# Patient Record
Sex: FEMALE | Race: WHITE | Employment: FULL TIME | URBAN - METROPOLITAN AREA
[De-identification: names, ages, dates, MRNs, and addresses within clinical notes are randomized per-mention and may not be internally consistent; named-entity substitution may affect disease eponyms.]

---

## 2023-06-14 ENCOUNTER — APPOINTMENT (OUTPATIENT)
Dept: GENERAL RADIOLOGY | Age: 43
End: 2023-06-14
Payer: COMMERCIAL

## 2023-06-14 ENCOUNTER — HOSPITAL ENCOUNTER (EMERGENCY)
Age: 43
Discharge: HOME | End: 2023-06-14
Payer: COMMERCIAL

## 2023-06-14 VITALS
DIASTOLIC BLOOD PRESSURE: 98 MMHG | OXYGEN SATURATION: 97 % | TEMPERATURE: 98.1 F | SYSTOLIC BLOOD PRESSURE: 168 MMHG | RESPIRATION RATE: 18 BRPM | HEART RATE: 97 BPM

## 2023-06-14 DIAGNOSIS — S83.412A SPRAIN OF MEDIAL COLLATERAL LIGAMENT OF LEFT KNEE, INITIAL ENCOUNTER: Primary | ICD-10-CM

## 2023-06-14 PROCEDURE — 73562 X-RAY EXAM OF KNEE 3: CPT

## 2023-06-14 PROCEDURE — 99283 EMERGENCY DEPT VISIT LOW MDM: CPT

## 2023-06-14 PROCEDURE — 6370000000 HC RX 637 (ALT 250 FOR IP): Performed by: PHYSICIAN ASSISTANT

## 2023-06-14 RX ORDER — OXYCODONE HYDROCHLORIDE AND ACETAMINOPHEN 5; 325 MG/1; MG/1
1 TABLET ORAL ONCE
Status: COMPLETED | OUTPATIENT
Start: 2023-06-14 | End: 2023-06-14

## 2023-06-14 RX ORDER — TRAMADOL HYDROCHLORIDE 50 MG/1
50 TABLET ORAL EVERY 6 HOURS PRN
Qty: 12 TABLET | Refills: 0 | Status: SHIPPED | OUTPATIENT
Start: 2023-06-14 | End: 2023-06-17

## 2023-06-14 RX ADMIN — OXYCODONE HYDROCHLORIDE AND ACETAMINOPHEN 1 TABLET: 5; 325 TABLET ORAL at 14:01

## 2023-06-14 ASSESSMENT — ENCOUNTER SYMPTOMS
CHEST TIGHTNESS: 0
VOMITING: 0
DIARRHEA: 0
NAUSEA: 0
SHORTNESS OF BREATH: 0
ABDOMINAL PAIN: 0

## 2023-06-14 ASSESSMENT — PAIN SCALES - GENERAL: PAINLEVEL_OUTOF10: 9

## 2023-06-14 NOTE — ED PROVIDER NOTES
[address] [facility]        Pt Name: Serafin De La Cruz  MRN: [id number]  [place] 1980  Date of evaluation: 6/14/2023  Provider: Will Orr PA-C  PCP: No primary care provider on file. Note Started: 2:02 PM EDT 6/14/23      AMY. I have evaluated this patient. CHIEF COMPLAINT       Chief Complaint   Patient presents with    Knee Injury     Patient in with complaints of injuring the left knee while roller skating today. States she heard a pop        HISTORY OF PRESENT ILLNESS: 1 or more Elements     History from : Patient    Limitations to history : None    Serafin De La Cruz is a 37 y.o. female who presents to the emergency department today stating she was rollerskating earlier today when she slipped and fell and heard a pop along the medial aspect of her left knee. She states she tried to get up and walk and felt another pop along the medial aspect of her left knee. She states it is painful to weight-bear. She denies significant swelling. She denies redness or warmth of the left knee. Nursing Notes were all reviewed and agreed with or any disagreements were addressed in the HPI. REVIEW OF SYSTEMS :      Review of Systems   Constitutional:  Negative for chills and fever. Respiratory:  Negative for chest tightness and shortness of breath. Cardiovascular:  Negative for chest pain. Gastrointestinal:  Negative for abdominal pain, diarrhea, nausea and vomiting. Genitourinary:  Negative for dysuria. Musculoskeletal:  Positive for arthralgias, gait problem and joint swelling. All other systems reviewed and are negative. Positives and Pertinent negatives as per HPI. SURGICAL HISTORY     Past Surgical History:   Procedure Laterality Date    CHOLECYSTECTOMY         CURRENTMEDICATIONS       Previous Medications    No medications on file       ALLERGIES     Patient has no known allergies.     FAMILYHISTORY

## 2023-06-19 ENCOUNTER — TELEPHONE (OUTPATIENT)
Dept: ORTHOPEDIC SURGERY | Age: 43
End: 2023-06-19

## 2023-06-19 NOTE — TELEPHONE ENCOUNTER
Other PATIENT CALLED. SHE IS SCHEDULED TO HAVE MRI TODAY AT 9AM. SHE SPOKE WITH DR Deep Morris ON Saturday SINCE HE WAS ON CALL. ASKING THAT WE SEND [facility] HER ORDER STAT.  Josh Cargo [phone]

## 2023-06-20 ENCOUNTER — OFFICE VISIT (OUTPATIENT)
Dept: ORTHOPEDIC SURGERY | Age: 43
End: 2023-06-20

## 2023-06-20 VITALS — RESPIRATION RATE: 18 BRPM | HEIGHT: 62 IN

## 2023-06-20 DIAGNOSIS — S83.412A COMPLETE TEAR OF MEDIAL COLLATERAL LIGAMENT OF LEFT KNEE, INITIAL ENCOUNTER: ICD-10-CM

## 2023-06-20 DIAGNOSIS — S83.512A RUPTURE OF ANTERIOR CRUCIATE LIGAMENT OF LEFT KNEE, INITIAL ENCOUNTER: Primary | ICD-10-CM

## 2023-06-20 DIAGNOSIS — M25.562 ACUTE PAIN OF LEFT KNEE: ICD-10-CM

## 2023-06-20 NOTE — PROGRESS NOTES
CHIEF COMPLAINT: Left knee pain. DATE OF INJURY: 6/14/23    History:   Darell Akers is a 37 y.o. female here for left knee follow-up. Initial history: referred by [facility] ED for evaluation and treatment of left knee pain / injury. The patient complains of left knee pain. This is evaluated as a personal injury. The pain began 1 day ago. Rate pain 10/10. There was a history of injury. She was roller skating at [facility] and went over a wet bridge when she slipped and fell. She describes a valgus injury. She did hear a pop. She tried to get up to walk and heard another pop at the medial aspect of the knee and states her knee \"went completely out\". The pain is located medial and deep within the knee. She has significant pain with weight bearing. She complains of instability with weight bearing. The knee has given out or felt unstable. The patient cannot bend and straighten the knee fully. There is swelling in the knee. There was not catching / locking of the knee. The patient has not had PT. The patient has not had an injection. The patient has taken NSAIDs. She is taking tramadol from the ED but it is causing vomiting. The patient has tried ice. 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    Interval History: Her knee still bothers her. She rates pain 8/10. She has been wearing the short knee brace. She is using a walker, because she had difficulty with crutches. Past Medical History:   Diagnosis Date    Headache        Past Surgical History:   Procedure Laterality Date    CHOLECYSTECTOMY         History reviewed. No pertinent family history.     Social History     Socioeconomic History    Marital status:      Spouse name: None    Number of children: None    Years of education: None    Highest education level: None   Occupational History    Occupation: Group Therapist for [employer]   Tobacco Use    Smoking status: Never    Smokeless tobacco: Never   Substance and

## 2023-06-26 ENCOUNTER — HOSPITAL ENCOUNTER (OUTPATIENT)
Dept: PHYSICAL THERAPY | Age: 43
Setting detail: THERAPIES SERIES
Discharge: HOME | End: 2023-06-26
Payer: COMMERCIAL

## 2023-06-26 PROCEDURE — 97016 VASOPNEUMATIC DEVICE THERAPY: CPT

## 2023-06-26 PROCEDURE — 97530 THERAPEUTIC ACTIVITIES: CPT

## 2023-06-26 PROCEDURE — 97110 THERAPEUTIC EXERCISES: CPT

## 2023-06-26 PROCEDURE — 97161 PT EVAL LOW COMPLEX 20 MIN: CPT

## 2023-06-30 ENCOUNTER — HOSPITAL ENCOUNTER (OUTPATIENT)
Dept: PHYSICAL THERAPY | Age: 43
Setting detail: THERAPIES SERIES
Discharge: HOME | End: 2023-06-30
Payer: COMMERCIAL

## 2023-06-30 PROCEDURE — 97140 MANUAL THERAPY 1/> REGIONS: CPT

## 2023-06-30 PROCEDURE — 97112 NEUROMUSCULAR REEDUCATION: CPT

## 2023-06-30 PROCEDURE — 97110 THERAPEUTIC EXERCISES: CPT

## 2023-07-03 ENCOUNTER — APPOINTMENT (OUTPATIENT)
Dept: PHYSICAL THERAPY | Age: 43
End: 2023-07-03
Payer: COMMERCIAL

## 2023-07-03 ENCOUNTER — HOSPITAL ENCOUNTER (OUTPATIENT)
Dept: PHYSICAL THERAPY | Age: 43
Setting detail: THERAPIES SERIES
Discharge: HOME | End: 2023-07-03
Payer: COMMERCIAL

## 2023-07-03 PROCEDURE — 97110 THERAPEUTIC EXERCISES: CPT

## 2023-07-03 PROCEDURE — 97140 MANUAL THERAPY 1/> REGIONS: CPT

## 2023-07-03 PROCEDURE — 97112 NEUROMUSCULAR REEDUCATION: CPT

## 2023-07-03 NOTE — FLOWSHEET NOTE
[facility]  Phone: [phone]   Fax: [phone]    Physical Therapy Daily Treatment Note    Date:  2023     Patient Name:  Tobi Andrews    :  1980  MRN: [id number]  Medical Diagnosis:  Rupture of anterior cruciate ligament of left knee, initial encounter [P04.061P]  Treatment Diagnosis: Decreased L knee ROM, strength, muscle activation, swelling, weight bearing tolerance and control limiting L LE usage with ADL, IADL, Recreational and work activity. Insurance/Certification information:  PT Insurance Information: [facility] (61 PT visits PCY;  policy; $6533 deductible)  Physician Information:  Sanjeev Yee MD    Plan of care signed (Y/N): []  Yes [x]  No     Date of Patient follow up with Physician:      Progress Report: []  Yes  [x]  No     Date Range for reporting period:  Beginnin2023  Ending:     Progress report due (10 Rx/or 30 days whichever is less): visit #10 or  (date)     Recertification due (POC duration/ or 90 days whichever is less): visit #16 or  (date)     Visit # Insurance Allowable Auth required? Date Range   3 60 []  Yes  [x]  No -       Units approved Units used Date Range   - - -     Latex Allergy:  [x]NO      []YES  Preferred Language for Healthcare:   [x]English       []other:    Functional Scale:       Date assessed:  LEFS: raw score = 0; dysfunction = 100%      Pain level:  1/10     SUBJECTIVE:  Patient reports she has trouble getting past 90 degrees of flexion due to feeling of swelling and squeezing \"like a blood pressure cuff around it. \"    OBJECTIVE:   : PROM to 88 degrees sitting, long sitting AROM: 10-79 degrees, mid patellar measurements at LLE 42.5 cm, RLE 40; bandage observed at abrasion site, no redness or warmth      RESTRICTIONS/PRECAUTIONS: Pt is WBAT with brace on at all times in standing; MRI confirm ACL and MCL tear, meniscal tear and posterior lateral

## 2023-07-06 ENCOUNTER — OFFICE VISIT (OUTPATIENT)
Dept: ORTHOPEDIC SURGERY | Age: 43
End: 2023-07-06

## 2023-07-06 ENCOUNTER — HOSPITAL ENCOUNTER (OUTPATIENT)
Dept: PHYSICAL THERAPY | Age: 43
Setting detail: THERAPIES SERIES
Discharge: HOME | End: 2023-07-06
Payer: COMMERCIAL

## 2023-07-06 VITALS — RESPIRATION RATE: 16 BRPM | BODY MASS INDEX: 30.18 KG/M2 | WEIGHT: 164 LBS | HEIGHT: 62 IN

## 2023-07-06 DIAGNOSIS — S83.512A RUPTURE OF ANTERIOR CRUCIATE LIGAMENT OF LEFT KNEE, INITIAL ENCOUNTER: Primary | ICD-10-CM

## 2023-07-06 DIAGNOSIS — S83.412A COMPLETE TEAR OF MEDIAL COLLATERAL LIGAMENT OF LEFT KNEE, INITIAL ENCOUNTER: ICD-10-CM

## 2023-07-06 PROCEDURE — 97110 THERAPEUTIC EXERCISES: CPT

## 2023-07-06 PROCEDURE — 97116 GAIT TRAINING THERAPY: CPT

## 2023-07-06 NOTE — FLOWSHEET NOTE
purpose of improving mobility and quad tone / recruitment which will allow for increased overall function including but not limited to self-care, transfers, ambulation, and ascending / descending stairs. Charges:  Timed Code Treatment Minutes: 45   Total Treatment Minutes: 45     [] EVAL - LOW (17368)   [] EVAL - MOD (36407)  [] EVAL - HIGH (49042)  [] RE-EVAL (12327)  [x] IB(27945) x 2      [] Ionto  [] NMR (57261) x 1    [] Vaso  [] Manual (57107) x      [] Ultrasound  [x] TA x  1      [] Mech Traction (61687)  [] Aquatic Therapy x     [] ES (un) (95116):   [] Home Management Training x  [] ES(attended) (05055)   [] Dry Needling 1-2 muscles (12966):  [] Dry Needling 3+ muscles (519165  [] Group:      [] Other:     GOALS:  Patient stated goal: return to driving, stair navigation, hiking and work without issues  [] Progressing: [] Met: [] Not Met: [] Adjusted    Therapist goals for Patient:   Short Term Goals: To be achieved in: 2 weeks  1. Independent in HEP and progression per patient tolerance, in order to prevent re-injury. [] Progressing: [] Met: [] Not Met: [] Adjusted  2. Patient will have a decrease in pain to facilitate improvement in movement, function, and ADLs as indicated by Functional Deficits. [] Progressing: [] Met: [] Not Met: [] Adjusted    Long Term Goals: To be achieved in: 12 weeks  1. Pt will improve LEFS by 9 points to reduce disability and progress towards PLOF. [] Progressing: [] Met: [] Not Met: [] Adjusted  2. Patient will demonstrate increased AROM to 0-110 to allow for proper joint functioning as needed to get in/out of her car   [] Progressing: [] Met: [] Not Met: [] Adjusted  3. Patient will demonstrate an increase in Strength to at least 4/5 as well as good proximal hip strength and control to allow for increased quad strength needed for ambulation with LRAD and WBAT with household distances  [] Progressing: [] Met: [] Not Met: [] Adjusted  4.  Patient will return to

## 2023-07-10 ENCOUNTER — HOSPITAL ENCOUNTER (OUTPATIENT)
Dept: PHYSICAL THERAPY | Age: 43
Setting detail: THERAPIES SERIES
Discharge: HOME | End: 2023-07-10
Payer: COMMERCIAL

## 2023-07-10 PROCEDURE — 97140 MANUAL THERAPY 1/> REGIONS: CPT

## 2023-07-10 PROCEDURE — 97110 THERAPEUTIC EXERCISES: CPT

## 2023-07-10 NOTE — FLOWSHEET NOTE
[address]  Phone: [phone]   Fax: [phone]    Physical Therapy Daily Treatment Note    Date:  07/10/2023     Patient Name:  Shin Fried    :  1980  MRN: [id number]  Medical Diagnosis:  Rupture of anterior cruciate ligament of left knee, initial encounter [S83.512U]  Treatment Diagnosis: Decreased L knee ROM, strength, muscle activation, swelling, weight bearing tolerance and control limiting L LE usage with ADL, IADL, Recreational and work activity. Insurance/Certification information:  PT Insurance Information: [facility] (61 PT visits PCY;  policy; $8958 deductible)  Physician Information:  Sanjeev Yee MD    Plan of care signed (Y/N): [x]  Yes []  No     Date of Patient follow up with Physician:      Progress Report: []  Yes  [x]  No     Date Range for reporting period:  Beginnin2023  Ending:     Progress report due (10 Rx/or 30 days whichever is less): visit #10 or       Recertification due (POC duration/ or 90 days whichever is less): visit #16 or  (date)     Visit # Insurance Allowable Auth required? Date Range    60 []  Yes  [x]  No        Units approved Units used Date Range   - - -     Latex Allergy:  [x]NO      []YES  Preferred Language for Healthcare:   [x]English       []other:    Functional Scale:       Date assessed:  LEFS: raw score = 0; dysfunction = 100%      Pain level:  10 with activity, /10 current     SUBJECTIVE:  Patient reports that she was able to walk 0.5 miles with her walker and brace. She is having increased calf tightness after this.     OBJECTIVE:   7/10: PROM 2-105 degrees  : PROM left knee  - : PROM to 88 degrees sitting, long sitting AROM: 10-79 degrees, mid patellar measurements at LLE 42.5 cm, RLE 40; bandage observed at abrasion site, no redness or warmth      RESTRICTIONS/PRECAUTIONS: Pt is WBAT with brace on at all times in standing; MRI

## 2023-07-12 ENCOUNTER — HOSPITAL ENCOUNTER (OUTPATIENT)
Dept: PHYSICAL THERAPY | Age: 43
Setting detail: THERAPIES SERIES
Discharge: HOME | End: 2023-07-12
Payer: COMMERCIAL

## 2023-07-12 PROCEDURE — 97110 THERAPEUTIC EXERCISES: CPT

## 2023-07-12 PROCEDURE — 97140 MANUAL THERAPY 1/> REGIONS: CPT

## 2023-07-12 PROCEDURE — 97016 VASOPNEUMATIC DEVICE THERAPY: CPT

## 2023-07-12 NOTE — FLOWSHEET NOTE
Continue per plan of care [] Alter current plan (see comments)  [] Plan of care initiated [] Hold pending MD visit [] Discharge    Electronically signed by: Umberto Huertas PT, DPT      Note: If patient does not return for scheduled/ recommended follow up visits, this note will serve as a discharge from care along with most recent update on progress.

## 2023-07-17 ENCOUNTER — HOSPITAL ENCOUNTER (OUTPATIENT)
Dept: PHYSICAL THERAPY | Age: 43
Setting detail: THERAPIES SERIES
Discharge: HOME | End: 2023-07-17
Payer: COMMERCIAL

## 2023-07-17 PROCEDURE — 97016 VASOPNEUMATIC DEVICE THERAPY: CPT

## 2023-07-17 PROCEDURE — 97110 THERAPEUTIC EXERCISES: CPT

## 2023-07-17 PROCEDURE — 97140 MANUAL THERAPY 1/> REGIONS: CPT

## 2023-07-17 NOTE — FLOWSHEET NOTE
[address]  Phone: [phone]   Fax: [phone]    Physical Therapy Daily Treatment Note    Date:  2023     Patient Name:  Sage Nuno    :  1980  MRN: [id number]  Medical Diagnosis:  Rupture of anterior cruciate ligament of left knee, initial encounter [S83.512B]  Treatment Diagnosis: Decreased L knee ROM, strength, muscle activation, swelling, weight bearing tolerance and control limiting L LE usage with ADL, IADL, Recreational and work activity. Insurance/Certification information:  PT Insurance Information: [facility] (61 PT visits PCY;  policy; $0055 deductible)  Physician Information:  Dyan Limon MD    Plan of care signed (Y/N): [x]  Yes []  No     Date of Patient follow up with Physician:      Progress Report: []  Yes  [x]  No     Date Range for reporting period:  Beginnin2023  Ending:     Progress report due (10 Rx/or 30 days whichever is less): visit #10 or       Recertification due (POC duration/ or 90 days whichever is less): visit #16 or       Visit # Insurance Allowable Auth required? Date Range   7 60 []  Yes  [x]  No        Units approved Units used Date Range   - - -     Latex Allergy:  [x]NO      []YES  Preferred Language for Healthcare:   [x]English       []other:    Functional Scale:       Date assessed:  LEFS: raw score = 0; dysfunction = 100%      Pain level:  0/10, 5-8/10 with end range upright biking    SUBJECTIVE:  Patient reports she has been walking better recently, performed her HEP 2 hours ago prior to starting this session, and felt like her knee was feeling better after using gameready device last session.     OBJECTIVE:   : PROM 1-108 degrees  7/10: PROM 2-105 degrees  : PROM left knee 6 - : PROM to 88 degrees sitting, long sitting AROM: 10-79 degrees, mid patellar measurements at LLE 42.5 cm, RLE 40; bandage observed at abrasion site, no

## 2023-07-19 ENCOUNTER — HOSPITAL ENCOUNTER (OUTPATIENT)
Dept: PHYSICAL THERAPY | Age: 43
Setting detail: THERAPIES SERIES
Discharge: HOME | End: 2023-07-19
Payer: COMMERCIAL

## 2023-07-19 PROCEDURE — 97016 VASOPNEUMATIC DEVICE THERAPY: CPT | Performed by: SPECIALIST/TECHNOLOGIST

## 2023-07-19 PROCEDURE — 97110 THERAPEUTIC EXERCISES: CPT | Performed by: SPECIALIST/TECHNOLOGIST

## 2023-07-19 PROCEDURE — 97112 NEUROMUSCULAR REEDUCATION: CPT | Performed by: SPECIALIST/TECHNOLOGIST

## 2023-07-19 NOTE — FLOWSHEET NOTE
[address]  Phone: [phone]   Fax: [phone]    Physical Therapy Daily Treatment Note    Date:  2023     Patient Name:  Erika Rodriguez    :  1980  MRN: [id number]  Medical Diagnosis:  Rupture of anterior cruciate ligament of left knee, initial encounter [S83.512D]  Treatment Diagnosis: Decreased L knee ROM, strength, muscle activation, swelling, weight bearing tolerance and control limiting L LE usage with ADL, IADL, Recreational and work activity. Insurance/Certification information:  PT Insurance Information: [facility] (61 PT visits PCY;  policy; $8660 deductible)  Physician Information:  Maine Nguyen MD    Plan of care signed (Y/N): [x]  Yes []  No     Date of Patient follow up with Physician:      Progress Report: []  Yes  [x]  No     Date Range for reporting period:  Beginnin2023  Ending:     Progress report due (10 Rx/or 30 days whichever is less): visit #10 or       Recertification due (POC duration/ or 90 days whichever is less): visit #16 or       Visit # Insurance Allowable Auth required? Date Range   8 60 []  Yes  [x]  No        Units approved Units used Date Range   - - -     Latex Allergy:  [x]NO      []YES  Preferred Language for Healthcare:   [x]English       []other:    Functional Scale:       Date assessed:  LEFS: raw score = 0; dysfunction = 100%      Pain level:  0/10, 5-8/10 with end range upright biking    SUBJECTIVE:  Pt reports that her knee is doing better. Pt.  Lety December back on  with Brigette. Most of her discomfort is in the medial knee. Knee is stiff most of the time. Has been very active in last 24 hours which may have triggered the increase in swelling/ stiffness in her Rt knee. OBJECTIVE:    PROM -5/ 0 110 w/ strap   after estim. Fair/ poor quad activation. Visible swelling present.     : PROM 1-108 degrees  7/10: PROM 2-105 degrees  : PROM left

## 2023-07-21 ENCOUNTER — HOSPITAL ENCOUNTER (OUTPATIENT)
Dept: PHYSICAL THERAPY | Age: 43
Setting detail: THERAPIES SERIES
Discharge: HOME | End: 2023-07-21
Payer: COMMERCIAL

## 2023-07-21 PROCEDURE — 97112 NEUROMUSCULAR REEDUCATION: CPT | Performed by: SPECIALIST/TECHNOLOGIST

## 2023-07-21 PROCEDURE — 97016 VASOPNEUMATIC DEVICE THERAPY: CPT | Performed by: SPECIALIST/TECHNOLOGIST

## 2023-07-21 PROCEDURE — 97110 THERAPEUTIC EXERCISES: CPT | Performed by: SPECIALIST/TECHNOLOGIST

## 2023-07-21 NOTE — FLOWSHEET NOTE
[address]  Phone: [phone]   Fax: [phone]    Physical Therapy Daily Treatment Note    Date:  2023     Patient Name:  Kimberlee Leblanc    :  1980  MRN: [id number]  Medical Diagnosis:  Rupture of anterior cruciate ligament of left knee, initial encounter [L18.964K]  Treatment Diagnosis: Decreased L knee ROM, strength, muscle activation, swelling, weight bearing tolerance and control limiting L LE usage with ADL, IADL, Recreational and work activity. Insurance/Certification information:  PT Insurance Information: [facility] (61 PT visits PCY;  policy; $8398 deductible)  Physician Information:  Jh Barnard MD    Plan of care signed (Y/N): [x]  Yes []  No     Date of Patient follow up with Physician:      Progress Report: []  Yes  [x]  No     Date Range for reporting period:  Beginnin2023  Ending:     Progress report due (10 Rx/or 30 days whichever is less): visit #10 or       Recertification due (POC duration/ or 90 days whichever is less): visit #16 or       Visit # Insurance Allowable Auth required? Date Range   9 60 []  Yes  [x]  No        Units approved Units used Date Range   - - -     Latex Allergy:  [x]NO      []YES  Preferred Language for Healthcare:   [x]English       []other:    Functional Scale:       Date assessed:  LEFS: raw score = 0; dysfunction = 100%      Pain level:  0/10, 5-8/10 with end range upright biking    SUBJECTIVE: Pt has been wearing the ace bandage which seems to be helping her swelling. Goes back on  with Yarelis Jacobson. Most of her discomfort is in the medial knee. Knee is stiff most of the time. Has been very active in last 24 hours which may have triggered the increase in swelling/ stiffness in her Rt knee. OBJECTIVE:    Left knee fair quad contraction. TKE deficit @ heel strike W/ no AD. Pt has inconsistent quad activity  today.   Left knee ROM -2/0- 105/

## 2023-07-24 ENCOUNTER — HOSPITAL ENCOUNTER (OUTPATIENT)
Dept: PHYSICAL THERAPY | Age: 43
Setting detail: THERAPIES SERIES
Discharge: HOME | End: 2023-07-24
Payer: COMMERCIAL

## 2023-07-24 PROCEDURE — 97530 THERAPEUTIC ACTIVITIES: CPT

## 2023-07-24 PROCEDURE — 97112 NEUROMUSCULAR REEDUCATION: CPT

## 2023-07-24 PROCEDURE — 97016 VASOPNEUMATIC DEVICE THERAPY: CPT

## 2023-07-24 PROCEDURE — 97140 MANUAL THERAPY 1/> REGIONS: CPT

## 2023-07-24 NOTE — PROGRESS NOTES
scheduled/ recommended follow up visits, this note will serve as a discharge from care along with most recent update on progress.

## 2023-07-27 ENCOUNTER — OFFICE VISIT (OUTPATIENT)
Dept: ORTHOPEDIC SURGERY | Age: 43
End: 2023-07-27
Payer: COMMERCIAL

## 2023-07-27 ENCOUNTER — HOSPITAL ENCOUNTER (OUTPATIENT)
Dept: PHYSICAL THERAPY | Age: 43
Setting detail: THERAPIES SERIES
Discharge: HOME | End: 2023-07-27
Payer: COMMERCIAL

## 2023-07-27 VITALS — WEIGHT: 174 LBS | HEIGHT: 62 IN | BODY MASS INDEX: 32.02 KG/M2 | RESPIRATION RATE: 16 BRPM

## 2023-07-27 DIAGNOSIS — S83.412A COMPLETE TEAR OF MEDIAL COLLATERAL LIGAMENT OF LEFT KNEE, INITIAL ENCOUNTER: ICD-10-CM

## 2023-07-27 DIAGNOSIS — S83.512A RUPTURE OF ANTERIOR CRUCIATE LIGAMENT OF LEFT KNEE, INITIAL ENCOUNTER: Primary | ICD-10-CM

## 2023-07-27 PROCEDURE — G8417 CALC BMI ABV UP PARAM F/U: HCPCS | Performed by: STUDENT IN AN ORGANIZED HEALTH CARE EDUCATION/TRAINING PROGRAM

## 2023-07-27 PROCEDURE — 99213 OFFICE O/P EST LOW 20 MIN: CPT | Performed by: STUDENT IN AN ORGANIZED HEALTH CARE EDUCATION/TRAINING PROGRAM

## 2023-07-27 PROCEDURE — G8427 DOCREV CUR MEDS BY ELIG CLIN: HCPCS | Performed by: STUDENT IN AN ORGANIZED HEALTH CARE EDUCATION/TRAINING PROGRAM

## 2023-07-27 PROCEDURE — 1036F TOBACCO NON-USER: CPT | Performed by: STUDENT IN AN ORGANIZED HEALTH CARE EDUCATION/TRAINING PROGRAM

## 2023-07-27 NOTE — PROGRESS NOTES
[facility]     Physical Therapy  Cancellation/No-show Note  Patient Name:  Branden Lomas  :  1980   Date:  2023  Cancelled visits to date: 0  No-shows to date: 1    Patient status for today's appointment patient:  []  Cancelled  []  Rescheduled appointment  [x]  No-show     Reason given by patient:  []  Patient ill  []  Conflicting appointment  []  No transportation    []  Conflict with work  [x]  No reason given  []  Other:     Comments:      Phone call information:   [x]  Phone call made today to patient at 1 - 1:30 pm time at number provided:      []  Patient answered, conversation as follows:    [x]  Patient did not answer, message left as follows: Checking in with patient if expecting to attend or make additional visits due to ortho f/u in another 6 weeks with no surgery scheduled until then.  []  Phone call not made today  []  Phone call not needed - pt contacted us to cancel and provided reason for cancellation.      Electronically signed by:  Lucretia Nuno, PT , DPT

## 2023-07-27 NOTE — PROGRESS NOTES
CHIEF COMPLAINT: Left knee pain. DATE OF INJURY: 6/14/23    History:   Kimberlee Leblanc is a 37 y.o. female here for left knee follow-up. Initial history: referred by [facility] ED for evaluation and treatment of left knee pain / injury. The patient complains of left knee pain. This is evaluated as a personal injury. The pain began 1 day ago. Rate pain 10/10. There was a history of injury. She [facility] roller [facility] and went over a wet bridge when she slipped and fell. She describes a valgus injury. She did hear a pop. She tried to get up to walk and heard another pop at the medial aspect of the knee and states her knee \"went completely out\". The pain is located medial and deep within the knee. She has significant pain with weight bearing. She complains of instability with weight bearing. The knee has given out or felt unstable. The patient cannot bend and straighten the knee fully. There is swelling in the knee. There was not catching / locking of the knee. The patient has not had PT. The patient has not had an injection. The patient has taken NSAIDs. She is taking tramadol from the ED but it is causing vomiting. The patient has tried ice. 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    Interval History: She has been in physical therapy. She feels like her situation is a Catch-22 because she needs to work on range of motion and strengthening however sometimes this causes increased swelling and she knows that she needs to decrease the swelling in order to improve her range of motion. She continues to complain of pretty significant pain at the [facility] with knee flexion. She rates a 7/10. 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         Past Medical History:   Diagnosis Date    Headache        Past Surgical History:

## 2023-08-02 ENCOUNTER — HOSPITAL ENCOUNTER (OUTPATIENT)
Dept: PHYSICAL THERAPY | Age: 43
Setting detail: THERAPIES SERIES
Discharge: HOME | End: 2023-08-02
Payer: COMMERCIAL

## 2023-08-02 PROCEDURE — 97016 VASOPNEUMATIC DEVICE THERAPY: CPT | Performed by: SPECIALIST/TECHNOLOGIST

## 2023-08-02 PROCEDURE — 97112 NEUROMUSCULAR REEDUCATION: CPT | Performed by: SPECIALIST/TECHNOLOGIST

## 2023-08-02 PROCEDURE — 97110 THERAPEUTIC EXERCISES: CPT | Performed by: SPECIALIST/TECHNOLOGIST

## 2023-08-02 NOTE — FLOWSHEET NOTE
[address] [facility]  Phone: [phone]   Fax: [phone]    Physical Therapy Daily Treatment Note    Date:  2023     Patient Name:  Edel Martinez    :  1980  MRN: [id number]  Medical Diagnosis:  Rupture of anterior cruciate ligament of left knee, initial encounter [S83.512U]  Treatment Diagnosis: Decreased L knee ROM, strength, muscle activation, swelling, weight bearing tolerance and control limiting L LE usage with ADL, IADL, Recreational and work activity. Insurance/Certification information:  PT Insurance Information: [address] [place] [address] (60 PT visits PCY;  policy; $5061 deductible)  Physician Information:  Karime Lakhani MD    Plan of care signed (Y/N): [x]  Yes []  No     Date of Patient follow up with Physician: Stephanie Prince      Progress Report: [x]  Yes  []  No     Date Range for reporting period:  Beginnin2023  PN:2023  Ending:     Progress report due (10 Rx/or 30 days whichever is less): visit #60 or     Recertification due (POC duration/ or 90 days whichever is less): visit #16 or       Visit # Insurance Allowable Auth required? Date Range   11 60 []  Yes  [x]  No        Units approved Units used Date Range   - - -     Latex Allergy:  [x]NO      []YES  Preferred Language for Healthcare:   [x]English       []other:    Functional Scale:       Date assessed:  LEFS: raw score = 17/80; dysfunction = 78.75%    LEFS: raw score = 0; dysfunction = 100%      Pain level:   4/10, highest is 8/10 achy stiffness mainly especially w/ bending    SUBJECTIVE: Pt. having good and bad days. Late Sept for surgery and unsure if its going. Received her NMES unit yesterday and just used today. Dr Akhil Almendarez wants her to push her ROM and strength will make gains in later time. Got her work FMLA figured out . Doing prone quad stretch/ wallslides, and EOB supported with her RT leg.   Performing  TKE and heel propping and

## 2023-08-09 ENCOUNTER — HOSPITAL ENCOUNTER (OUTPATIENT)
Dept: PHYSICAL THERAPY | Age: 43
Setting detail: THERAPIES SERIES
Discharge: HOME | End: 2023-08-09
Payer: COMMERCIAL

## 2023-08-09 NOTE — PROGRESS NOTES
[facility]     Physical Therapy  Cancellation/No-show Note  Patient Name:  Kim Blanco  :  1980   Date:  2023  Cancelled visits to date: 0  No-shows to date: ,     Patient status for today's appointment patient:  []  Cancelled  []  Rescheduled appointment  [x]  No-show      Reason given by patient:  []  Patient ill  []  Conflicting appointment  []  No transportation    []  Conflict with work  []  No reason given  []  Other:     Comments:      Phone call information:   []  Phone call made today  time at number provided:      []  Patient answered, conversation as follows:    []  Patient did not answer, message left as follows:   []  Phone call not made today  [x]  Phone call not needed - pt contacted us to cancel and provided reason for cancellation. Pt thought her appt was in the afternoon not at 915 this am     Electronically signed by:  Casey Smith, [address] , [id number]

## 2023-08-10 ENCOUNTER — TELEPHONE (OUTPATIENT)
Dept: ORTHOPEDIC SURGERY | Age: 43
End: 2023-08-10

## 2023-08-11 ENCOUNTER — HOSPITAL ENCOUNTER (OUTPATIENT)
Dept: PHYSICAL THERAPY | Age: 43
Setting detail: THERAPIES SERIES
Discharge: HOME | End: 2023-08-11
Payer: COMMERCIAL

## 2023-08-11 PROCEDURE — 97140 MANUAL THERAPY 1/> REGIONS: CPT

## 2023-08-11 PROCEDURE — 97110 THERAPEUTIC EXERCISES: CPT

## 2023-08-11 PROCEDURE — 97016 VASOPNEUMATIC DEVICE THERAPY: CPT

## 2023-08-11 NOTE — FLOWSHEET NOTE
[address] [facility]  Phone: [phone]   Fax: [phone]    Physical Therapy Daily Treatment Note    Date:  2023     Patient Name:  Edel Martinez    :  1980  MRN: [id number]  Medical Diagnosis:  Rupture of anterior cruciate ligament of left knee, initial encounter [S83.512E]  Treatment Diagnosis: Decreased L knee ROM, strength, muscle activation, swelling, weight bearing tolerance and control limiting L LE usage with ADL, IADL, Recreational and work activity. Insurance/Certification information:  PT Insurance Information: [address] [place] [address] (60 PT visits PCY; 76/80 policy; $7700 deductible)  Physician Information:  Karime Lakhani MD    Plan of care signed (Y/N): [x]  Yes []  No     Date of Patient follow up with Physician: Stephanie Prince      Progress Report: [x]  Yes  []  No     Date Range for reporting period:  Beginnin2023  PN:2023  Ending:     Progress report due (10 Rx/or 30 days whichever is less): visit #24 or     Recertification due (POC duration/ or 90 days whichever is less): visit #16 or       Visit # Insurance Allowable Auth required? Date Range   12 60 []  Yes  [x]  No        Units approved Units used Date Range   - - -     Latex Allergy:  [x]NO      []YES  Preferred Language for Healthcare:   [x]English       []other:    Functional Scale:       Date assessed:  LEFS: raw score = 17/80; dysfunction = 78.75%    LEFS: raw score = 0; dysfunction = 100%       Pain level:   10, highest is 810 achy stiffness mainly especially w/ bending    SUBJECTIVE: Pt using her NMES home unit 2x per day. She feels like range of motion, strength, and swelling are all improving. She is unable to put on tennis shoes. OBJECTIVE:   : 10 SLR without lag  /2  TTP across the medial knee  with palpation.   Left knee ROM -2/0-110 w/ wallslide  : 25 # right knee ext, left knee ext 46#   Left knee fair quad

## 2023-08-14 ENCOUNTER — HOSPITAL ENCOUNTER (OUTPATIENT)
Dept: PHYSICAL THERAPY | Age: 43
Setting detail: THERAPIES SERIES
Discharge: HOME | End: 2023-08-14
Payer: COMMERCIAL

## 2023-08-14 PROCEDURE — 97140 MANUAL THERAPY 1/> REGIONS: CPT

## 2023-08-14 PROCEDURE — 97110 THERAPEUTIC EXERCISES: CPT

## 2023-08-14 PROCEDURE — 97016 VASOPNEUMATIC DEVICE THERAPY: CPT

## 2023-08-14 NOTE — FLOWSHEET NOTE
[address] [facility]  Phone: [phone]   Fax: [phone]    Physical Therapy Daily Treatment Note    Date:  2023     Patient Name:  Kim Blanco    :  1980  MRN: [id number]  Medical Diagnosis:  Rupture of anterior cruciate ligament of left knee, initial encounter [S83.512H]  Treatment Diagnosis: Decreased L knee ROM, strength, muscle activation, swelling, weight bearing tolerance and control limiting L LE usage with ADL, IADL, Recreational and work activity. Insurance/Certification information:  PT Insurance Information: [address] [place] [address] (60 PT visits PCY;  policy; $4799 deductible)  Physician Information:  Andrew Finch MD    Plan of care signed (Y/N): [x]  Yes []  No     Date of Patient follow up with Physician: Claire Caicedo      Progress Report: [x]  Yes  []  No     Date Range for reporting period:  Beginnin2023  PN:2023  Ending:     Progress report due (10 Rx/or 30 days whichever is less): visit #09 or     Recertification due (POC duration/ or 90 days whichever is less): visit #16 or       Visit # Insurance Allowable Auth required? Date Range   13 60 []  Yes  [x]  No        Units approved Units used Date Range   - - -     Latex Allergy:  [x]NO      []YES  Preferred Language for Healthcare:   [x]English       []other:    Functional Scale:       Date assessed:  LEFS: raw score = 17/80; dysfunction = 78.75%    LEFS: raw score = 0; dysfunction = 100%       Pain level:   10, highest is 810 achy stiffness mainly especially w/ bending    SUBJECTIVE: Patient reports unable to wear regular tennis shoes. Continues to use home NMES unit. OBJECTIVE:   : 10 SLR without lag  8/2  TTP across the medial knee  with palpation. Left knee ROM -2/0-110 w/ wallslide  : 25 # right knee ext, left knee ext 46#   Left knee fair quad contraction. TKE deficit @ heel strike W/ no AD.  Pt has inconsistent

## 2023-08-17 ENCOUNTER — HOSPITAL ENCOUNTER (OUTPATIENT)
Dept: PHYSICAL THERAPY | Age: 43
Setting detail: THERAPIES SERIES
Discharge: HOME | End: 2023-08-17
Payer: COMMERCIAL

## 2023-08-17 PROCEDURE — 97016 VASOPNEUMATIC DEVICE THERAPY: CPT

## 2023-08-17 PROCEDURE — 97140 MANUAL THERAPY 1/> REGIONS: CPT

## 2023-08-17 PROCEDURE — 97110 THERAPEUTIC EXERCISES: CPT

## 2023-08-17 PROCEDURE — 97530 THERAPEUTIC ACTIVITIES: CPT

## 2023-08-17 NOTE — FLOWSHEET NOTE
[address]  Phone: [phone]   Fax: [phone]    Physical Therapy Daily Treatment Note     Date:  2023     Patient Name:  Nahid Correa    :  1980  MRN: [id number]  Medical Diagnosis:  Rupture of anterior cruciate ligament of left knee, initial encounter [S83.512A]  Treatment Diagnosis: Decreased L knee ROM, strength, muscle activation, swelling, weight bearing tolerance and control limiting L LE usage with ADL, IADL, Recreational and work activity. Insurance/Certification information:  PT Insurance Information: [facility] (60 PT visits PCY;  policy; $9534 deductible)  Physician Information:  Humera Gilliam MD    Plan of care signed (Y/N): [x]  Yes []  No     Date of Patient follow up with Physician: Antoinette Heredia      Progress Report: [x]  Yes  []  No     Date Range for reporting period:  Beginnin2023  PN:2023,   Ending:     Progress report due (10 Rx/or 30 days whichever is less): visit #62 or     Recertification due (POC duration/ or 90 days whichever is less): visit #16      Visit # Insurance Allowable Auth required? Date Range   14 60 []  Yes  [x]  No        Units approved Units used Date Range   - - -     Latex Allergy:  [x]NO      []YES  Preferred Language for Healthcare:   [x]English       []other:    Functional Scale:       Date assessed:  LEFS: raw score = 80; dysfunction = 78.75%    LEFS: raw score = 80; dysfunction = 78.75%    LEFS: raw score = 0; dysfunction = 100%       Pain level:   1/10    SUBJECTIVE: Patient reports home NMES unit is helping significantly. Patient reports it feels like something is in her knee limiting flexion. She is agreeable and also thinks starting three days per week will facilitate pre hab.     OBJECTIVE:   : 0 - 106 degrees supine limited by stiffness and swelling  : 10 SLR without lag  2  TTP across the medial knee  with

## 2023-08-21 ENCOUNTER — HOSPITAL ENCOUNTER (OUTPATIENT)
Dept: PHYSICAL THERAPY | Age: 43
Setting detail: THERAPIES SERIES
Discharge: HOME | End: 2023-08-21
Payer: COMMERCIAL

## 2023-08-21 PROCEDURE — 97140 MANUAL THERAPY 1/> REGIONS: CPT

## 2023-08-21 PROCEDURE — 97016 VASOPNEUMATIC DEVICE THERAPY: CPT

## 2023-08-21 PROCEDURE — 97116 GAIT TRAINING THERAPY: CPT

## 2023-08-21 NOTE — FLOWSHEET NOTE
using compression dressing to help control swelling. [x] Continue per plan of care [] Alter current plan (see comments)  [] Plan of care initiated [] Hold pending MD visit [] Discharge    Electronically signed by: Shanice Strong, PT, DPT, OMT-C    Note: If patient does not return for scheduled/ recommended follow up visits, this note will serve as a discharge from care along with most recent update on progress.

## 2023-08-22 ENCOUNTER — HOSPITAL ENCOUNTER (OUTPATIENT)
Dept: PHYSICAL THERAPY | Age: 43
Setting detail: THERAPIES SERIES
Discharge: HOME | End: 2023-08-22
Payer: COMMERCIAL

## 2023-08-22 PROCEDURE — 97140 MANUAL THERAPY 1/> REGIONS: CPT

## 2023-08-22 PROCEDURE — 97530 THERAPEUTIC ACTIVITIES: CPT

## 2023-08-22 NOTE — FLOWSHEET NOTE
[address]  Phone: [phone]   Fax: [phone]    Physical Therapy Daily Treatment Note     Date:  2023     Patient Name:  Lavon Johnson    :  1980  MRN: [id number]  Medical Diagnosis:  Rupture of anterior cruciate ligament of left knee, initial encounter [S83.512A]  Treatment Diagnosis: Decreased L knee ROM, strength, muscle activation, swelling, weight bearing tolerance and control limiting L LE usage with ADL, IADL, Recreational and work activity. Insurance/Certification information:  PT Insurance Information: [facility] (60 PT visits PCY; /46 policy; $5333 deductible)  Physician Information:  Winter Lentz MD    Plan of care signed (Y/N): [x]  Yes []  No     Date of Patient follow up with Physician: Parvin Eric      Progress Report: []  Yes  [x]  No     Date Range for reporting period:  Beginnin2023  PN:  PN:   Ending:     Progress report due (10 Rx/or 30 days whichever is less): visit #46 or     Recertification due (POC duration/ or 90 days whichever is less): visit #16      Visit # Insurance Allowable Auth required? Date Range    60 []  Yes  [x]  No      Latex Allergy:  [x]NO      []YES  Preferred Language for Healthcare:   [x]English       []other:    Functional Scale:       Date assessed:  LEFS: raw score = 17/80; dysfunction = 78.75%    LEFS: raw score = 17/80; dysfunction = 78.75%    LEFS: raw score = 0; dysfunction = 100%       Pain level:   0/10    SUBJECTIVE: Pt reports she didn't sleep much last night. Reports she was too tired to put ACE wrap on today. OBJECTIVE:    -   Girth measurements - L knee   PRE MLD   mid patellar: 43.5 cm  Suprapatellar: 47.0 cm    POST MLD  mid patellar: 42.8 cm  Suprapatellar: 47.0 cm    : ROM not assessed.  See vaso chart for vaso measures following ice compression  : 0 - 106 degrees supine limited by stiffness and

## 2023-08-23 ENCOUNTER — APPOINTMENT (OUTPATIENT)
Dept: PHYSICAL THERAPY | Age: 43
End: 2023-08-23
Payer: COMMERCIAL

## 2023-08-25 ENCOUNTER — HOSPITAL ENCOUNTER (OUTPATIENT)
Dept: PHYSICAL THERAPY | Age: 43
Setting detail: THERAPIES SERIES
Discharge: HOME | End: 2023-08-25
Payer: COMMERCIAL

## 2023-08-25 PROCEDURE — 97016 VASOPNEUMATIC DEVICE THERAPY: CPT

## 2023-08-25 PROCEDURE — 97110 THERAPEUTIC EXERCISES: CPT

## 2023-08-25 PROCEDURE — 97140 MANUAL THERAPY 1/> REGIONS: CPT

## 2023-08-25 NOTE — FLOWSHEET NOTE
from 8/11 until appropriate for surgery. Recommended using compression dressing to help control swelling. [x] Continue per plan of care [] Alter current plan (see comments)  [] Plan of care initiated [] Hold pending MD visit [] Discharge    Electronically signed by: Raymond Zhou PT, DPT    Note: If patient does not return for scheduled/ recommended follow up visits, this note will serve as a discharge from care along with most recent update on progress.

## 2023-08-28 ENCOUNTER — HOSPITAL ENCOUNTER (OUTPATIENT)
Dept: PHYSICAL THERAPY | Age: 43
Setting detail: THERAPIES SERIES
Discharge: HOME | End: 2023-08-28
Payer: COMMERCIAL

## 2023-08-28 ENCOUNTER — TELEPHONE (OUTPATIENT)
Dept: ORTHOPEDIC SURGERY | Age: 43
End: 2023-08-28

## 2023-08-28 PROCEDURE — 97140 MANUAL THERAPY 1/> REGIONS: CPT

## 2023-08-28 PROCEDURE — 97016 VASOPNEUMATIC DEVICE THERAPY: CPT

## 2023-08-28 PROCEDURE — 97110 THERAPEUTIC EXERCISES: CPT

## 2023-08-28 NOTE — FLOWSHEET NOTE
[address] [facility]  Phone: [phone]   Fax: [phone]    Physical Therapy Daily Treatment Note     Date:  2023     Patient Name:  Sage Nuno    :  1980  MRN: [id number]  Medical Diagnosis:  Rupture of anterior cruciate ligament of left knee, initial encounter [S83.512A]  Treatment Diagnosis: Decreased L knee ROM, strength, muscle activation, swelling, weight bearing tolerance and control limiting L LE usage with ADL, IADL, Recreational and work activity. Insurance/Certification information:  PT Insurance Information: [address] [place] [address] (60 PT visits PCY;  policy; $8511 deductible)  Physician Information:  Dyan Limon MD    Plan of care signed (Y/N): [x]  Yes []  No     Date of Patient follow up with Physician: Araceli Trevizo      Progress Report: []  Yes  [x]  No     Date Range for reporting period:  Beginnin2023  PN:  PN:   Ending:     Progress report due (10 Rx/or 30 days whichever is less): visit #30 or     Recertification due (POC duration/ or 90 days whichever is less): visit #16      Visit # Insurance Allowable Auth required? Date Range   18 60 []  Yes  [x]  No      Latex Allergy:  [x]NO      []YES  Preferred Language for Healthcare:   [x]English       []other:    Functional Scale:       Date assessed:  LEFS: raw score = 17/80; dysfunction = 78.75%    LEFS: raw score = 17/80; dysfunction = 78.75%    LEFS: raw score = 0; dysfunction = 100%       Pain level:   0/10    SUBJECTIVE:   Pt did have some inc stiffness following last session with pop felt, but didn't notice any bruising. OBJECTIVE:   : 116 deg knee flexion PROM     -   Girth measurements - L knee   PRE MLD   mid patellar: 43.5 cm  Suprapatellar: 47.0 cm    POST MLD  mid patellar: 42.8 cm  Suprapatellar: 47.0 cm    : ROM not assessed.  See vaso chart for vaso measures following ice compression  : 0 - 106 degrees

## 2023-08-29 NOTE — TELEPHONE ENCOUNTER
Faxed completed disability form to [address] [facility] of [address] [facility]

## 2023-08-30 ENCOUNTER — HOSPITAL ENCOUNTER (OUTPATIENT)
Dept: PHYSICAL THERAPY | Age: 43
Setting detail: THERAPIES SERIES
Discharge: HOME | End: 2023-08-30
Payer: COMMERCIAL

## 2023-08-30 PROCEDURE — 97110 THERAPEUTIC EXERCISES: CPT

## 2023-08-30 PROCEDURE — 97016 VASOPNEUMATIC DEVICE THERAPY: CPT

## 2023-08-30 NOTE — FLOWSHEET NOTE
return to outdoor hiking less than 1 mile without limitations or issues. [x] Progressing: [] Met: [] Not Met: [] Adjusted    Overall Progression Towards Functional goals/ Treatment Progress Update:  [x] Patient is progressing as expected towards functional goals listed. [] Progression is slowed due to complexities/Impairments listed. [] Progression has been slowed due to co-morbidities. [] Plan just implemented, too soon to assess goals progression <30days   [] Goals require adjustment due to lack of progress  [] Patient is not progressing as expected and requires additional follow up with physician  [] Other    Persisting Functional Limitations/Impairments:  [x]Sitting [x]Standing   [x]Walking [x]Stairs   [x]Transfers [x]ADLs   [x]Squatting/bending [x]Kneeling  [x]Housework []Job related tasks  [x]Driving []Sports/Recreation   [x]Sleeping []Other:    ASSESSMENT:  Focused on L knee mobility, range, quad and glute strength within pt's tolerances. Ex's performed this date in bold above in flow-sheet. Pt's L LE strength is gradually improving indicated by visual observation of controlled mobility with [place] ex's and pt is able to maintain TKE with SLR flex. Additionally, pt's active L knee ranges are improving as well with gradual drops in knee stiffness/tightness following prescribed stretches. Will continue to address swelling, range of motion, quad inhibition, and overall function to prehab for future surgery.     Treatment/Activity Tolerance:  [x] Pt able to complete treatment [] Patient limited by fatique  [] Patient limited by pain  [] Patient limited by other medical complications  [] Other:     Prognosis: - [x] Good [] Fair  [] Poor    Patient Requires Follow-up: [x] Yes  [] No         Plan for next treatment session: primarily focus on improving range of motion at this point due to slowed progress with end range flexion, wbat with AD and work gait mechanics    PLAN: PT 3x per week going forward from 8/11

## 2023-09-01 ENCOUNTER — HOSPITAL ENCOUNTER (OUTPATIENT)
Dept: PHYSICAL THERAPY | Age: 43
Setting detail: THERAPIES SERIES
Discharge: HOME | End: 2023-09-01
Payer: COMMERCIAL

## 2023-09-01 PROCEDURE — 97110 THERAPEUTIC EXERCISES: CPT

## 2023-09-01 NOTE — FLOWSHEET NOTE
findings, and POC.  8/22 educated on tensoshape for swelling management. Provided with size L and XL above knee. Advised to wear during the day and take off at night and for exercise with e-stim                        Neuromuscular Re-ed (21517)                             Manual Intervention (08550)        Tib/Fem Mobs   Patellar mobs   8/21      EOM tibial distraction with knee flexion PROM utilizing belt in figure 8 at ankle 8/21   Prone tibial distraction with knee flexion PROM, belt at distal thigh; C-R at [facility] knee flexion 8/21 8/25: pt and PT felt small pop in anterior knee at [facility] following C-R          MLD - L knee and thigh clearing to L inguina lymph nodes    Education on self MLD for HEP and provided with diagram/picture              Modalities:   Date: Pressure Time Position: Location of   Measurement  (must include 2) Pre-vaso   (girth cm, pitting) Post-vaso   (girth cm, pitting) Effect of edema on function         8/30 []Low  []Medium  [x]High 15 min []Supine  []Reclined  [x]Seated  []Legs Elevated [x]Suprapatellar  [x]Mid-patellar  []Infrapatellar  [] 46.7  42.3     45.7  42.3 The swelling is clinically significant and interferes with the patient's functional ability to achieve normal knee flexion and ext ROM. Pt. Education:  7/6, 8/17: reinforced importance of now progressing to range of motion increases with HEP including 1 hour of passive extension per day and 20-30 sec end range flexion holds with HEP, continued use of ice and elevation for swelling  7/3/2023: reinforced HEP, reviewed potential use of prone hangs, reviewed icing, compression wrap, and elevation to reduce swelling. Emphasized continued progress of ROM as tolerable to 0 degrees and past 90 degrees. 06/30/2023  -pt educated on diagnosis, prognosis and expectations for rehab  -all pt questions were answered    Home Exercise Program:  Access Code: [email or web address]  URL: [email or web address]  Date: 06/26/2023  Prepared by: Colt Jimenez

## 2023-09-06 ENCOUNTER — HOSPITAL ENCOUNTER (OUTPATIENT)
Dept: PHYSICAL THERAPY | Age: 43
Setting detail: THERAPIES SERIES
Discharge: HOME | End: 2023-09-06
Payer: COMMERCIAL

## 2023-09-06 PROCEDURE — 97110 THERAPEUTIC EXERCISES: CPT

## 2023-09-06 PROCEDURE — 97530 THERAPEUTIC ACTIVITIES: CPT

## 2023-09-06 PROCEDURE — 97016 VASOPNEUMATIC DEVICE THERAPY: CPT

## 2023-09-06 NOTE — FLOWSHEET NOTE
[address] [facility]  Phone: [phone]   Fax: [phone]    Physical Therapy Daily Treatment Note     Date:  2023     Patient Name:  Mahin Srinivasan    :  1980  MRN: [id number]  Medical Diagnosis:  Rupture of anterior cruciate ligament of left knee, initial encounter [S83.512A]  Treatment Diagnosis: Decreased L knee ROM, strength, muscle activation, swelling, weight bearing tolerance and control limiting L LE usage with ADL, IADL, Recreational and work activity. Insurance/Certification information:  PT Insurance Information: [facility] (60 PT visits PCY; 88/84 policy; $9380 deductible)  Physician Information:  Sherri Alicia MD    Plan of care signed (Y/N): [x]  Yes []  No     Date of Patient follow up with Physician: Guero Nelson      Progress Report: []  Yes  [x]  No     Date Range for reporting period:  Beginnin2023  PN:  PN:   Ending:     Progress report due (10 Rx/or 30 days whichever is less): visit #35 or     Recertification due (POC duration/ or 90 days whichever is less): visit #16      Visit # Insurance Allowable Auth required? Date Range    60 []  Yes  [x]  No      Latex Allergy:  [x]NO      []YES  Preferred Language for Healthcare:   [x]English       []other:    Functional Scale:       Date assessed:  LEFS: raw score = 16/80; dysfunction = 80%    LEFS: raw score = 17/80; dysfunction = 78.75%    LEFS: raw score = 17/80; dysfunction = 78.75%    LEFS: raw score = 0; dysfunction = 100%       Pain level:   0/10    SUBJECTIVE:   Patient reports her strength is doing okay but her range of motion is still not improving much.     OBJECTIVE:   : AROM L knee 3- 106 at start of session after 5 min bike warmup  Date: Pressure Time Position: Location of   Measurement  (must include 2) Pre-vaso   (girth cm, pitting) Post-vaso   (girth cm, pitting) Effect of edema on function

## 2023-09-07 ENCOUNTER — OFFICE VISIT (OUTPATIENT)
Dept: ORTHOPEDIC SURGERY | Age: 43
End: 2023-09-07
Payer: COMMERCIAL

## 2023-09-07 VITALS — RESPIRATION RATE: 16 BRPM | HEIGHT: 62 IN | WEIGHT: 182 LBS | BODY MASS INDEX: 33.49 KG/M2

## 2023-09-07 DIAGNOSIS — S83.512A RUPTURE OF ANTERIOR CRUCIATE LIGAMENT OF LEFT KNEE, INITIAL ENCOUNTER: Primary | ICD-10-CM

## 2023-09-07 PROCEDURE — G8417 CALC BMI ABV UP PARAM F/U: HCPCS | Performed by: STUDENT IN AN ORGANIZED HEALTH CARE EDUCATION/TRAINING PROGRAM

## 2023-09-07 PROCEDURE — G8427 DOCREV CUR MEDS BY ELIG CLIN: HCPCS | Performed by: STUDENT IN AN ORGANIZED HEALTH CARE EDUCATION/TRAINING PROGRAM

## 2023-09-07 PROCEDURE — 1036F TOBACCO NON-USER: CPT | Performed by: STUDENT IN AN ORGANIZED HEALTH CARE EDUCATION/TRAINING PROGRAM

## 2023-09-07 PROCEDURE — 99214 OFFICE O/P EST MOD 30 MIN: CPT | Performed by: STUDENT IN AN ORGANIZED HEALTH CARE EDUCATION/TRAINING PROGRAM

## 2023-09-08 ENCOUNTER — HOSPITAL ENCOUNTER (OUTPATIENT)
Dept: PHYSICAL THERAPY | Age: 43
Setting detail: THERAPIES SERIES
Discharge: HOME | End: 2023-09-08
Payer: COMMERCIAL

## 2023-09-08 PROCEDURE — 97110 THERAPEUTIC EXERCISES: CPT

## 2023-09-08 PROCEDURE — 97530 THERAPEUTIC ACTIVITIES: CPT

## 2023-09-08 NOTE — FLOWSHEET NOTE
in movement, function, and ADLs as indicated by Functional Deficits. [x] Progressing: [] Met: [] Not Met: [] Adjusted    Long Term Goals: To be achieved in: 12 weeks  1. Pt will improve LEFS by 9 points to reduce disability and progress towards PLOF. [] Progressing: [x] Met: [] Not Met: [] Adjusted  2. Patient will demonstrate increased AROM to 0-110 to allow for proper joint functioning as needed to get in/out of her car   [x] Progressing: [] Met: [] Not Met: [] Adjusted  3. Patient will demonstrate an increase in Strength to at least 4/5 as well as good proximal hip strength and control to allow for increased quad strength needed for ambulation with LRAD and WBAT with household distances  [x] Progressing: [] Met: [] Not Met: [] Adjusted  4. Patient will return to functional activities including full return to work without increased symptoms or restriction. [x] Progressing: [] Met: [] Not Met: [] Adjusted  5. Patient will be able to return to outdoor hiking less than 1 mile without limitations or issues. [x] Progressing: [] Met: [] Not Met: [] Adjusted    Overall Progression Towards Functional goals/ Treatment Progress Update:  [x] Patient is progressing as expected towards functional goals listed. [] Progression is slowed due to complexities/Impairments listed. [] Progression has been slowed due to co-morbidities. [] Plan just implemented, too soon to assess goals progression <30days   [] Goals require adjustment due to lack of progress  [] Patient is not progressing as expected and requires additional follow up with physician  [] Other    Persisting Functional Limitations/Impairments:  [x]Sitting [x]Standing   [x]Walking [x]Stairs   [x]Transfers [x]ADLs   [x]Squatting/bending [x]Kneeling  [x]Housework []Job related tasks  [x]Driving []Sports/Recreation   [x]Sleeping []Other:    ASSESSMENT:  Patient expecting to get ACL surgery with allograft on September 28th.  Surgery will vary dependent on

## 2023-09-11 ENCOUNTER — HOSPITAL ENCOUNTER (OUTPATIENT)
Dept: PHYSICAL THERAPY | Age: 43
Setting detail: THERAPIES SERIES
Discharge: HOME | End: 2023-09-11
Payer: COMMERCIAL

## 2023-09-11 PROCEDURE — 97530 THERAPEUTIC ACTIVITIES: CPT

## 2023-09-11 PROCEDURE — 97110 THERAPEUTIC EXERCISES: CPT

## 2023-09-11 PROCEDURE — 97116 GAIT TRAINING THERAPY: CPT

## 2023-09-11 NOTE — FLOWSHEET NOTE
in movement, function, and ADLs as indicated by Functional Deficits. [x] Progressing: [] Met: [] Not Met: [] Adjusted    Long Term Goals: To be achieved in: 12 weeks  1. Pt will improve LEFS by 9 points to reduce disability and progress towards PLOF. [] Progressing: [x] Met: [] Not Met: [] Adjusted  2. Patient will demonstrate increased AROM to 0-110 to allow for proper joint functioning as needed to get in/out of her car   [x] Progressing: [] Met: [] Not Met: [] Adjusted  3. Patient will demonstrate an increase in Strength to at least 4/5 as well as good proximal hip strength and control to allow for increased quad strength needed for ambulation with LRAD and WBAT with household distances  [x] Progressing: [] Met: [] Not Met: [] Adjusted  4. Patient will return to functional activities including full return to work without increased symptoms or restriction. [x] Progressing: [] Met: [] Not Met: [] Adjusted  5. Patient will be able to return to outdoor hiking less than 1 mile without limitations or issues. [x] Progressing: [] Met: [] Not Met: [] Adjusted    Overall Progression Towards Functional goals/ Treatment Progress Update:  [x] Patient is progressing as expected towards functional goals listed. [] Progression is slowed due to complexities/Impairments listed. [] Progression has been slowed due to co-morbidities.   [] Plan just implemented, too soon to assess goals progression <30days   [] Goals require adjustment due to lack of progress  [] Patient is not progressing as expected and requires additional follow up with physician  [] Other    Persisting Functional Limitations/Impairments:  [x]Sitting [x]Standing   [x]Walking [x]Stairs   [x]Transfers [x]ADLs   [x]Squatting/bending [x]Kneeling  [x]Housework []Job related tasks  [x]Driving []Sports/Recreation   [x]Sleeping []Other:    ASSESSMENT:  Ana DALEY re introduced this date at 70% bodyweight for gait training requiring cues to reduce circumduction and

## 2023-09-13 ENCOUNTER — HOSPITAL ENCOUNTER (OUTPATIENT)
Dept: PHYSICAL THERAPY | Age: 43
Setting detail: THERAPIES SERIES
Discharge: HOME | End: 2023-09-13
Payer: COMMERCIAL

## 2023-09-13 PROCEDURE — 97530 THERAPEUTIC ACTIVITIES: CPT

## 2023-09-13 PROCEDURE — 97116 GAIT TRAINING THERAPY: CPT

## 2023-09-13 PROCEDURE — 97112 NEUROMUSCULAR REEDUCATION: CPT

## 2023-09-13 PROCEDURE — 97110 THERAPEUTIC EXERCISES: CPT

## 2023-09-13 NOTE — FLOWSHEET NOTE
[address] [facility]  Phone: [phone]   Fax: [phone]    Physical Therapy Daily Treatment Note     Date:  2023     Patient Name:  Magy Kirkland    :  1980  MRN: [id number]  Medical Diagnosis:  Rupture of anterior cruciate ligament of left knee, initial encounter [S83.512T]  Treatment Diagnosis: Decreased L knee ROM, strength, muscle activation, swelling, weight bearing tolerance and control limiting L LE usage with ADL, IADL, Recreational and work activity. Insurance/Certification information:  PT Insurance Information: [address] [place] [address] (60 PT visits PCY;  policy; $3251 deductible)  Physician Information:  Lindsay Davis MD    Plan of care signed (Y/N): [x]  Yes []  No     Date of Patient follow up with Physician: Tejas Dumont      Progress Report: []  Yes  [x]  No     Date Range for reporting period:  Beginnin2023  PN:  PN:   Ending:     Progress report due (10 Rx/or 30 days whichever is less): visit #66 or     Recertification due (POC duration/ or 90 days whichever is less): visit #16      Visit # Insurance Allowable Auth required? Date Range    60 []  Yes  [x]  No      Latex Allergy:  [x]NO      []YES  Preferred Language for Healthcare:   [x]English       []other:    Functional Scale:       Date assessed:  LEFS: raw score = 16/80; dysfunction = 80%    LEFS: raw score = 17/80; dysfunction = 78.75%    LEFS: raw score = 17/80; dysfunction = 78.75%    LEFS: raw score = 0; dysfunction = 100%       Pain level:   0/10    SUBJECTIVE:   Patient reports she feels okay and confident to use one crutch now.     OBJECTIVE:   : AROM L knee 3- 106 at start of session after 5 min bike warmup      : AROM L knee 0-110  : 116 deg knee flexion PROM     -   Girth measurements - L knee   PRE MLD   mid patellar: 43.5 cm  Suprapatellar: 47.0 cm    POST MLD  mid patellar: 42.8 cm  Suprapatellar: 47.0

## 2023-09-15 ENCOUNTER — HOSPITAL ENCOUNTER (OUTPATIENT)
Dept: PHYSICAL THERAPY | Age: 43
Setting detail: THERAPIES SERIES
Discharge: HOME | End: 2023-09-15
Payer: COMMERCIAL

## 2023-09-15 PROCEDURE — 97112 NEUROMUSCULAR REEDUCATION: CPT

## 2023-09-15 PROCEDURE — 97530 THERAPEUTIC ACTIVITIES: CPT

## 2023-09-15 PROCEDURE — 97110 THERAPEUTIC EXERCISES: CPT

## 2023-09-15 NOTE — FLOWSHEET NOTE
patient for proper LE, core and proximal hip recruitment and positioning and eccentric body weight control with ambulation re-education including up and down stairs     Home Exercise Program:    [] (82262) Reviewed/Progressed HEP activities related to strengthening, flexibility, endurance, ROM of core, proximal hip and LE for functional self-care, mobility, lifting and ambulation/stair navigation   [] (33815)Reviewed/Progressed HEP activities related to improving balance, coordination, kinesthetic sense, posture, motor skill, proprioception of core, proximal hip and LE for self care, mobility, lifting, and ambulation/stair navigation      Manual Treatments:  PROM / STM / Oscillations-Mobs:  G-I, II, III, IV (PA's, Inf., Post.)  [] (17745) Provided manual therapy to mobilize LE, proximal hip and/or LS spine soft tissue/joints for the purpose of modulating pain, promoting relaxation,  increasing ROM, reducing/eliminating soft tissue swelling/inflammation/restriction, improving soft tissue extensibility and allowing for proper ROM for normal function with self care, mobility, lifting and ambulation. Modalities:  [] (67580) Vasopneumatic compression: Utilized vasopneumatic compression to decrease edema / swelling for the purpose of improving mobility and quad tone / recruitment which will allow for increased overall function including but not limited to self-care, transfers, ambulation, and ascending / descending stairs.        Charges:  Timed Code Treatment Minutes: 40   Total Treatment Minutes: 40     [] EVAL - LOW (46872)   [] EVAL - MOD (86164)  [] EVAL - HIGH (71247)  [] RE-EVAL (64768)  [x] WV(89566) x 1    [] Ionto  [x] NMR (35547) x 1    [] Vaso x   [] Manual (55557) x      [] Ultrasound  [x] TA x   1     [] Mech Traction (83836)  [] Aquatic Therapy x     [] ES (un) (02020):   [] Home Management Training x  [] ES(attended) (09915)   [] Dry Needling 1-2 muscles (53077):  [] Dry Needling 3+ muscles (852030  []

## 2023-09-18 ENCOUNTER — HOSPITAL ENCOUNTER (OUTPATIENT)
Dept: PHYSICAL THERAPY | Age: 43
Setting detail: THERAPIES SERIES
Discharge: HOME | End: 2023-09-18
Payer: COMMERCIAL

## 2023-09-18 PROCEDURE — 97530 THERAPEUTIC ACTIVITIES: CPT

## 2023-09-18 PROCEDURE — 97112 NEUROMUSCULAR REEDUCATION: CPT

## 2023-09-18 PROCEDURE — 97110 THERAPEUTIC EXERCISES: CPT

## 2023-09-18 NOTE — FLOWSHEET NOTE
Patient is not progressing as expected and requires additional follow up with physician  [] Other    Persisting Functional Limitations/Impairments:  [x]Sitting [x]Standing   [x]Walking [x]Stairs   [x]Transfers [x]ADLs   [x]Squatting/bending [x]Kneeling  [x]Housework []Job related tasks  [x]Driving []Sports/Recreation   [x]Sleeping []Other:    ASSESSMENT:  Pt tolerated increased weight on leg press this date without difficulty or pain. Held knee extension machine. Pt continues to demonstrate weakness and appropriately challenged with dynamic balance and functional tasks. Continue to prepare for upcoming surgery next week with strengthening and improved proprioception as tolerated. Treatment/Activity Tolerance:  [x] Pt able to complete treatment [] Patient limited by fatique  [] Patient limited by pain  [] Patient limited by other medical complications  [] Other:     Prognosis: - [x] Good [] Fair  [] Poor    Patient Requires Follow-up: [x] Yes  [] No         Plan for next treatment session: primarily focus on improving range of motion at this point due to slowed progress with end range flexion, wbat with AD and work gait mechanics    PLAN: PT back down to 2 x per week til sx on Sep 28th   Recommended using compression dressing to help control swelling. [x] Continue per plan of care [] Alter current plan (see comments)  [] Plan of care initiated [] Hold pending MD visit [] Discharge    Electronically signed by: Marina Solo, PTA [id number]    Note: If patient does not return for scheduled/ recommended follow up visits, this note will serve as a discharge from care along with most recent update on progress.

## 2023-09-21 ENCOUNTER — HOSPITAL ENCOUNTER (OUTPATIENT)
Dept: PHYSICAL THERAPY | Age: 43
Setting detail: THERAPIES SERIES
Discharge: HOME | End: 2023-09-21
Payer: COMMERCIAL

## 2023-09-21 PROCEDURE — 97110 THERAPEUTIC EXERCISES: CPT

## 2023-09-21 PROCEDURE — 97112 NEUROMUSCULAR REEDUCATION: CPT

## 2023-09-21 PROCEDURE — 97530 THERAPEUTIC ACTIVITIES: CPT

## 2023-09-21 NOTE — FLOWSHEET NOTE
[facility]  Phone: [phone]   Fax: [phone]    Physical Therapy Daily Treatment Note     Date:  2023     Patient Name:  Altagracia Kelly    :  1980  MRN: [id number]  Medical Diagnosis:  Rupture of anterior cruciate ligament of left knee, initial encounter [S83.512K]  Treatment Diagnosis: Decreased L knee ROM, strength, muscle activation, swelling, weight bearing tolerance and control limiting L LE usage with ADL, IADL, Recreational and work activity. Insurance/Certification information:  PT Insurance Information: [facility] (61 PT visits PCY; 88/78 policy; $0675 deductible)  Physician Information:  Sebastián Potter MD    Plan of care signed (Y/N): [x]  Yes []  No     Date of Patient follow up with Physician: Surgery      Progress Report: []  Yes  [x]  No     Date Range for reporting period:  Beginnin2023  PN:  PN:   Ending:     Progress report due (10 Rx/or 30 days whichever is less): visit #21 or     Recertification due (POC duration/ or 90 days whichever is less): visit #16      Visit # Insurance Allowable Auth required? Date Range    60 []  Yes  [x]  No      Latex Allergy:  [x]NO      []YES  Preferred Language for Healthcare:   [x]English       []other:    Functional Scale:       Date assessed:  LEFS: raw score = 16/80; dysfunction = 80%    LEFS: raw score = 17/80; dysfunction = 78.75%    LEFS: raw score = 17/80; dysfunction = 78.75%    LEFS: raw score = 0; dysfunction = 100%       Pain level:   0/10    SUBJECTIVE:   Pt feels L knee mobility and general strength is slowly improving however, overall function remains substantially limited.      OBJECTIVE:   : AROM L knee 3- 106 at start of session after 5 min bike warmup      : AROM L knee 0-110  : 116 deg knee flexion PROM     -   Girth measurements - L knee   PRE MLD   mid patellar: 43.5 cm  Suprapatellar: 47.0

## 2023-09-25 ENCOUNTER — TELEPHONE (OUTPATIENT)
Dept: ORTHOPEDIC SURGERY | Age: 43
End: 2023-09-25

## 2023-09-25 DIAGNOSIS — S83.512A RUPTURE OF ANTERIOR CRUCIATE LIGAMENT OF LEFT KNEE, INITIAL ENCOUNTER: Primary | ICD-10-CM

## 2023-09-25 PROCEDURE — MISCCOLD COLD THERAPY UNIT AND PAD: Performed by: ORTHOPAEDIC SURGERY

## 2023-09-25 NOTE — TELEPHONE ENCOUNTER
General Question     Subject: ICE MACHINE  Patient and /or Facility Request: Amor Coy \"Malachi\"  Contact Number: [phone]        THE PT WILL STOP IN THE OFFICE AROUND 2:30 TO  THE ICE MACHINE AT THE  OFFICE  used

## 2023-09-25 NOTE — TELEPHONE ENCOUNTER
Surgery and/or Procedure Scheduling     Contact Name: Maeve Garcia \"Malachi\"  Surgical/Procedure Request: Sx on 12/28/23  Patient Contact Number: [phone]    Patient call and she just need to know what time she need to arrive for her surgery? And what about her [employer] she stated in the surgery forms they asked her to bring the machine with her on the day of her surgery or do she need to have one order for the day of her surgery? She just need a call back regarding these matters. Please Advise.

## 2023-09-25 NOTE — PROGRESS NOTES
Name_______________________________________Printed:____________________  Date and time of surgery_ 9/28/2023_____0900__________________Arrival Time:___0730_____________   1. The instructions given regarding when and if a patient needs to stop oral intake prior to surgery varies. Follow the specific instructions you were given                  _x__Nothing to eat or to drink after Midnight the night before.                   ____Carbo loading or instructions will be given to select patients-if you have been given those instructions -please do the following                           The evening before your surgery after dinner before midnight drink 40 ounces of gatorade. If you are diabetic use sugar free. The morning of surgery drink 40 ounces of water. This needs to be finished 3 hours prior to your surgery start time. 2. Take the following pills with a small sip of water on the morning of surgery___________________________________________________                  Do not take blood pressure medications ending in pril or sartan the ankita prior to surgery or the morning of surgery. Dr Radha Leon patient are not to take any medications the AM of surgery. 3. Aspirin, Ibuprofen, Advil, Naproxen, Vitamin E and other Anti-inflammatory products and supplements should be stopped for 5 -7days before surgery or as directed by your physician. 4. Check with your Doctor regarding stopping Plavix, Coumadin,Eliquis, Lovenox,Effient,Pradaxa,Xarelto, Fragmin or other blood thinners and follow their instructions. 5. Do not smoke, and do not drink any alcoholic beverages 24 hours prior to surgery. This includes NA Beer. Refrain from the usage of any recreational drugs. 6. You may brush your teeth and gargle the morning of surgery. DO NOT SWALLOW WATER   7. You MUST make arrangements for a responsible adult to stay on site while you are here and take you home after your surgery.  You will not be allowed to leave alone or drive insurance card. 19.  Visit our web site for additional information:  [employer]/patient-eprep              20.During flu season no children under the age of 15 are permitted in the hospital for the safety of all patients. 21. If you take a long acting insulin in the evening only  take half of your usual  dose the night  before your procedure              22. If you use a c-pap please bring DOS if staying overnight,             23.For your convenience [facility] has a pharmacy on site to fill your prescriptions. 24. If you use oxygen and have a portable tank please bring it  with you the DOS             25. Bring a complete list of all your medications with name and dose include any supplements. 26. Other__________________________________________   *Please call pre admission testing if you any further questions   Rosalinda Mc         [address] [facility] [address] [facility]  [phone]   [address] [place] [address]       VISITOR POLICY(subject to change)    Current policy is 2 visitors per patient. No children. Mask is  at the discretion of the facility. Visiting hours are 8a-8p. Overnight visitors will be at the discretion of the nurse. All policies subject to change. All above information reviewed with patient in person or by phone. Patient verbalizes understanding. All questions and concerns addressed.                                                                                                  Patient/Rep_____patinet_______________                                                                                                                                    PRE OP INSTRUCTIONS

## 2023-09-25 NOTE — TELEPHONE ENCOUNTER
L/M FOR Yomi Perez  Notified of surgery time for 9/28/2023:  Arrival Time: 6:00am  Surgery Time: 7:30am  Surgery Location: [address] [place] [address], [address] [place] [address] [address]    Patient informed of $150 cost which is required at the time of p/u for unit and pad. Informed this is not a requirement for surgery. If this is something she already has, she was asked to bring the pad with her to surgery on 9/28/2023. Ana Berumen was asked to call the office if this something she wishes to obtain. [employer] message also sent.

## 2023-09-26 ENCOUNTER — APPOINTMENT (OUTPATIENT)
Dept: PHYSICAL THERAPY | Age: 43
End: 2023-09-26
Payer: COMMERCIAL

## 2023-09-27 NOTE — DISCHARGE INSTRUCTIONS
Extra-strength Tylenol. You may also have had a prescription for an anti-inflammatory such as Celebrex or Naprosyn, and an anti-nausea medication such as Phenergan. Take the anti-inflammatory as scheduled with food, but take the narcotic and anti-nausea medication as needed. Narcotic pain medications can cause constipation. Make sure you are drinking adequate amounts of water. Take fiber supplements as needed. Consider using an over-the-counter stool softener and drinking prune juice. EXERCISE: Exercises are extremely important following arthroscopic surgery to help you regain the motion, strength, and flexibility of your knee. That is why we try to get you into physical therapy as soon as possible after surgery. We encourage you to move your knee as normally as possible to help with the return of your flexibility and motion. FOLLOW-UP: You should already have your first postoperative visit scheduled at the time your surgery is scheduled. If you do not, please call the office at [phone] to make the appointment. At the first visit, we will perform a wound check and go over the pictures from your surgery. At the second visit we will remove your stitches. ANESTHESIA DISCHARGE INSTRUCTIONS    Wear your seatbelt home. You are under the influence of drugs-do not drink alcohol, drive, operate machinery, make any important decisions or sign any legal documents for 24 hours. A responsible adult needs to be with you for 24 hours. You may experience lightheadedness, dizziness, or sleepiness following surgery. Rest at home today- increase activity as tolerated. Progress slowly to a regular diet unless your physician has instructed you otherwise. Drink plenty of water. If persistent nausea and vomiting becomes a problem, call your physician. Coughing, sore throat and muscle aches are other side effects of anesthesia, and should improve with time.   Do not drive or operate machinery while taking

## 2023-09-28 ENCOUNTER — ANESTHESIA (OUTPATIENT)
Dept: OPERATING ROOM | Age: 43
End: 2023-09-28
Payer: COMMERCIAL

## 2023-09-28 ENCOUNTER — ANESTHESIA EVENT (OUTPATIENT)
Dept: OPERATING ROOM | Age: 43
End: 2023-09-28
Payer: COMMERCIAL

## 2023-09-28 ENCOUNTER — HOSPITAL ENCOUNTER (OUTPATIENT)
Age: 43
Setting detail: OUTPATIENT SURGERY
Discharge: HOME | End: 2023-09-28
Attending: ORTHOPAEDIC SURGERY | Admitting: ORTHOPAEDIC SURGERY
Payer: COMMERCIAL

## 2023-09-28 VITALS
TEMPERATURE: 97 F | OXYGEN SATURATION: 95 % | RESPIRATION RATE: 16 BRPM | BODY MASS INDEX: 33.49 KG/M2 | DIASTOLIC BLOOD PRESSURE: 93 MMHG | HEART RATE: 81 BPM | WEIGHT: 182 LBS | SYSTOLIC BLOOD PRESSURE: 145 MMHG | HEIGHT: 62 IN

## 2023-09-28 DIAGNOSIS — S83.512A RUPTURE OF ANTERIOR CRUCIATE LIGAMENT OF LEFT KNEE, INITIAL ENCOUNTER: Primary | ICD-10-CM

## 2023-09-28 LAB — HCG UR QL: NEGATIVE

## 2023-09-28 PROCEDURE — 84703 CHORIONIC GONADOTROPIN ASSAY: CPT

## 2023-09-28 PROCEDURE — 6370000000 HC RX 637 (ALT 250 FOR IP): Performed by: ORTHOPAEDIC SURGERY

## 2023-09-28 PROCEDURE — 2500000003 HC RX 250 WO HCPCS: Performed by: NURSE ANESTHETIST, CERTIFIED REGISTERED

## 2023-09-28 PROCEDURE — 2580000003 HC RX 258: Performed by: ORTHOPAEDIC SURGERY

## 2023-09-28 PROCEDURE — 6360000002 HC RX W HCPCS: Performed by: ORTHOPAEDIC SURGERY

## 2023-09-28 PROCEDURE — 3600000014 HC SURGERY LEVEL 4 ADDTL 15MIN: Performed by: ORTHOPAEDIC SURGERY

## 2023-09-28 PROCEDURE — 7100000011 HC PHASE II RECOVERY - ADDTL 15 MIN: Performed by: ORTHOPAEDIC SURGERY

## 2023-09-28 PROCEDURE — 6360000002 HC RX W HCPCS: Performed by: NURSE ANESTHETIST, CERTIFIED REGISTERED

## 2023-09-28 PROCEDURE — 6360000002 HC RX W HCPCS: Performed by: ANESTHESIOLOGY

## 2023-09-28 PROCEDURE — 3600000004 HC SURGERY LEVEL 4 BASE: Performed by: ORTHOPAEDIC SURGERY

## 2023-09-28 PROCEDURE — 29888 ARTHRS AID ACL RPR/AGMNTJ: CPT | Performed by: ORTHOPAEDIC SURGERY

## 2023-09-28 PROCEDURE — 7100000010 HC PHASE II RECOVERY - FIRST 15 MIN: Performed by: ORTHOPAEDIC SURGERY

## 2023-09-28 PROCEDURE — 2709999900 HC NON-CHARGEABLE SUPPLY: Performed by: ORTHOPAEDIC SURGERY

## 2023-09-28 PROCEDURE — 2720000010 HC SURG SUPPLY STERILE: Performed by: ORTHOPAEDIC SURGERY

## 2023-09-28 PROCEDURE — 64447 NJX AA&/STRD FEMORAL NRV IMG: CPT | Performed by: ANESTHESIOLOGY

## 2023-09-28 PROCEDURE — C1762 CONN TISS, HUMAN(INC FASCIA): HCPCS | Performed by: ORTHOPAEDIC SURGERY

## 2023-09-28 PROCEDURE — 3700000000 HC ANESTHESIA ATTENDED CARE: Performed by: ORTHOPAEDIC SURGERY

## 2023-09-28 PROCEDURE — 7100000001 HC PACU RECOVERY - ADDTL 15 MIN: Performed by: ORTHOPAEDIC SURGERY

## 2023-09-28 PROCEDURE — 6370000000 HC RX 637 (ALT 250 FOR IP): Performed by: ANESTHESIOLOGY

## 2023-09-28 PROCEDURE — 3700000001 HC ADD 15 MINUTES (ANESTHESIA): Performed by: ORTHOPAEDIC SURGERY

## 2023-09-28 PROCEDURE — 29881 ARTHRS KNE SRG MNISECTMY M/L: CPT | Performed by: ORTHOPAEDIC SURGERY

## 2023-09-28 PROCEDURE — 7100000000 HC PACU RECOVERY - FIRST 15 MIN: Performed by: ORTHOPAEDIC SURGERY

## 2023-09-28 PROCEDURE — C1713 ANCHOR/SCREW BN/BN,TIS/BN: HCPCS | Performed by: ORTHOPAEDIC SURGERY

## 2023-09-28 PROCEDURE — C1776 JOINT DEVICE (IMPLANTABLE): HCPCS | Performed by: ORTHOPAEDIC SURGERY

## 2023-09-28 DEVICE — IMPLANTABLE DEVICE: Type: IMPLANTABLE DEVICE | Site: KNEE | Status: FUNCTIONAL

## 2023-09-28 DEVICE — GRAFTLINK (9.5X77 MM)
Type: IMPLANTABLE DEVICE | Site: KNEE | Status: FUNCTIONAL
Brand: FLEXIGRAFT®

## 2023-09-28 DEVICE — DEVICE GRFT FIX 4.75X19.1 MM BIOCOMPOSITE SWIVELOCK: Type: IMPLANTABLE DEVICE | Site: KNEE | Status: FUNCTIONAL

## 2023-09-28 RX ORDER — ACETAMINOPHEN 325 MG/1
650 TABLET ORAL ONCE
Status: COMPLETED | OUTPATIENT
Start: 2023-09-28 | End: 2023-09-28

## 2023-09-28 RX ORDER — MIDAZOLAM HYDROCHLORIDE 2 MG/2ML
2 INJECTION, SOLUTION INTRAMUSCULAR; INTRAVENOUS
Status: COMPLETED | OUTPATIENT
Start: 2023-09-28 | End: 2023-09-28

## 2023-09-28 RX ORDER — PROCHLORPERAZINE EDISYLATE 5 MG/ML
5 INJECTION INTRAMUSCULAR; INTRAVENOUS
Status: DISCONTINUED | OUTPATIENT
Start: 2023-09-28 | End: 2023-09-28 | Stop reason: HOSPADM

## 2023-09-28 RX ORDER — OXYCODONE HYDROCHLORIDE 5 MG/1
5 TABLET ORAL
Status: COMPLETED | OUTPATIENT
Start: 2023-09-28 | End: 2023-09-28

## 2023-09-28 RX ORDER — FENTANYL CITRATE 50 UG/ML
INJECTION, SOLUTION INTRAMUSCULAR; INTRAVENOUS PRN
Status: DISCONTINUED | OUTPATIENT
Start: 2023-09-28 | End: 2023-09-28 | Stop reason: SDUPTHER

## 2023-09-28 RX ORDER — HYDROMORPHONE HYDROCHLORIDE 2 MG/ML
0.5 INJECTION, SOLUTION INTRAMUSCULAR; INTRAVENOUS; SUBCUTANEOUS EVERY 5 MIN PRN
Status: DISCONTINUED | OUTPATIENT
Start: 2023-09-28 | End: 2023-09-28 | Stop reason: HOSPADM

## 2023-09-28 RX ORDER — ASPIRIN 81 MG/1
81 TABLET ORAL DAILY
Qty: 28 TABLET | Refills: 0
Start: 2023-09-28

## 2023-09-28 RX ORDER — DEXAMETHASONE SODIUM PHOSPHATE 4 MG/ML
INJECTION, SOLUTION INTRA-ARTICULAR; INTRALESIONAL; INTRAMUSCULAR; INTRAVENOUS; SOFT TISSUE PRN
Status: DISCONTINUED | OUTPATIENT
Start: 2023-09-28 | End: 2023-09-28 | Stop reason: SDUPTHER

## 2023-09-28 RX ORDER — HYDROMORPHONE HYDROCHLORIDE 2 MG/ML
INJECTION, SOLUTION INTRAMUSCULAR; INTRAVENOUS; SUBCUTANEOUS PRN
Status: DISCONTINUED | OUTPATIENT
Start: 2023-09-28 | End: 2023-09-28 | Stop reason: SDUPTHER

## 2023-09-28 RX ORDER — ROPIVACAINE HYDROCHLORIDE 5 MG/ML
INJECTION, SOLUTION EPIDURAL; INFILTRATION; PERINEURAL
Status: COMPLETED | OUTPATIENT
Start: 2023-09-28 | End: 2023-09-28

## 2023-09-28 RX ORDER — SODIUM CHLORIDE 9 MG/ML
INJECTION, SOLUTION INTRAVENOUS CONTINUOUS
Status: DISCONTINUED | OUTPATIENT
Start: 2023-09-28 | End: 2023-09-28 | Stop reason: HOSPADM

## 2023-09-28 RX ORDER — SODIUM CHLORIDE 0.9 % (FLUSH) 0.9 %
5-40 SYRINGE (ML) INJECTION EVERY 12 HOURS SCHEDULED
Status: DISCONTINUED | OUTPATIENT
Start: 2023-09-28 | End: 2023-09-28 | Stop reason: HOSPADM

## 2023-09-28 RX ORDER — ONDANSETRON 2 MG/ML
4 INJECTION INTRAMUSCULAR; INTRAVENOUS
Status: COMPLETED | OUTPATIENT
Start: 2023-09-28 | End: 2023-09-28

## 2023-09-28 RX ORDER — SODIUM CHLORIDE, SODIUM LACTATE, POTASSIUM CHLORIDE, CALCIUM CHLORIDE 600; 310; 30; 20 MG/100ML; MG/100ML; MG/100ML; MG/100ML
INJECTION, SOLUTION INTRAVENOUS CONTINUOUS
Status: DISCONTINUED | OUTPATIENT
Start: 2023-09-28 | End: 2023-09-28

## 2023-09-28 RX ORDER — POVIDONE-IODINE 10 MG/G
OINTMENT TOPICAL
Status: COMPLETED | OUTPATIENT
Start: 2023-09-28 | End: 2023-09-28

## 2023-09-28 RX ORDER — SODIUM CHLORIDE 9 MG/ML
INJECTION, SOLUTION INTRAVENOUS PRN
Status: DISCONTINUED | OUTPATIENT
Start: 2023-09-28 | End: 2023-09-28 | Stop reason: HOSPADM

## 2023-09-28 RX ORDER — SODIUM CHLORIDE 0.9 % (FLUSH) 0.9 %
5-40 SYRINGE (ML) INJECTION PRN
Status: DISCONTINUED | OUTPATIENT
Start: 2023-09-28 | End: 2023-09-28 | Stop reason: HOSPADM

## 2023-09-28 RX ORDER — ROCURONIUM BROMIDE 10 MG/ML
INJECTION, SOLUTION INTRAVENOUS PRN
Status: DISCONTINUED | OUTPATIENT
Start: 2023-09-28 | End: 2023-09-28 | Stop reason: SDUPTHER

## 2023-09-28 RX ORDER — LIDOCAINE HYDROCHLORIDE 20 MG/ML
INJECTION, SOLUTION INFILTRATION; PERINEURAL PRN
Status: DISCONTINUED | OUTPATIENT
Start: 2023-09-28 | End: 2023-09-28 | Stop reason: SDUPTHER

## 2023-09-28 RX ORDER — APREPITANT 40 MG/1
40 CAPSULE ORAL ONCE
Status: COMPLETED | OUTPATIENT
Start: 2023-09-28 | End: 2023-09-28

## 2023-09-28 RX ORDER — FENTANYL CITRATE 50 UG/ML
25 INJECTION, SOLUTION INTRAMUSCULAR; INTRAVENOUS EVERY 5 MIN PRN
Status: DISCONTINUED | OUTPATIENT
Start: 2023-09-28 | End: 2023-09-28 | Stop reason: HOSPADM

## 2023-09-28 RX ORDER — PHENYLEPHRINE HCL IN 0.9% NACL 1 MG/10 ML
SYRINGE (ML) INTRAVENOUS PRN
Status: DISCONTINUED | OUTPATIENT
Start: 2023-09-28 | End: 2023-09-28 | Stop reason: SDUPTHER

## 2023-09-28 RX ORDER — HYDRALAZINE HYDROCHLORIDE 20 MG/ML
10 INJECTION INTRAMUSCULAR; INTRAVENOUS
Status: DISCONTINUED | OUTPATIENT
Start: 2023-09-28 | End: 2023-09-28 | Stop reason: HOSPADM

## 2023-09-28 RX ORDER — LIDOCAINE HYDROCHLORIDE 10 MG/ML
0.5 INJECTION, SOLUTION EPIDURAL; INFILTRATION; INTRACAUDAL; PERINEURAL ONCE
Status: DISCONTINUED | OUTPATIENT
Start: 2023-09-28 | End: 2023-09-28 | Stop reason: HOSPADM

## 2023-09-28 RX ORDER — PROMETHAZINE HYDROCHLORIDE 25 MG/1
25 TABLET ORAL EVERY 6 HOURS PRN
Qty: 10 TABLET | Refills: 0 | Status: SHIPPED | OUTPATIENT
Start: 2023-09-28

## 2023-09-28 RX ORDER — MAGNESIUM HYDROXIDE 1200 MG/15ML
LIQUID ORAL CONTINUOUS PRN
Status: COMPLETED | OUTPATIENT
Start: 2023-09-28 | End: 2023-09-28

## 2023-09-28 RX ORDER — LABETALOL HYDROCHLORIDE 5 MG/ML
10 INJECTION, SOLUTION INTRAVENOUS
Status: DISCONTINUED | OUTPATIENT
Start: 2023-09-28 | End: 2023-09-28 | Stop reason: HOSPADM

## 2023-09-28 RX ORDER — MEPERIDINE HYDROCHLORIDE 25 MG/ML
12.5 INJECTION INTRAMUSCULAR; INTRAVENOUS; SUBCUTANEOUS
Status: DISCONTINUED | OUTPATIENT
Start: 2023-09-28 | End: 2023-09-28 | Stop reason: HOSPADM

## 2023-09-28 RX ORDER — MAGNESIUM SULFATE HEPTAHYDRATE 500 MG/ML
INJECTION, SOLUTION INTRAMUSCULAR; INTRAVENOUS PRN
Status: DISCONTINUED | OUTPATIENT
Start: 2023-09-28 | End: 2023-09-28 | Stop reason: SDUPTHER

## 2023-09-28 RX ORDER — SUCCINYLCHOLINE/SOD CL,ISO/PF 200MG/10ML
SYRINGE (ML) INTRAVENOUS PRN
Status: DISCONTINUED | OUTPATIENT
Start: 2023-09-28 | End: 2023-09-28 | Stop reason: SDUPTHER

## 2023-09-28 RX ORDER — ONDANSETRON 2 MG/ML
INJECTION INTRAMUSCULAR; INTRAVENOUS PRN
Status: DISCONTINUED | OUTPATIENT
Start: 2023-09-28 | End: 2023-09-28 | Stop reason: SDUPTHER

## 2023-09-28 RX ORDER — OXYCODONE HYDROCHLORIDE AND ACETAMINOPHEN 5; 325 MG/1; MG/1
1 TABLET ORAL EVERY 4 HOURS PRN
Qty: 40 TABLET | Refills: 0 | Status: SHIPPED | OUTPATIENT
Start: 2023-09-28 | End: 2023-10-05

## 2023-09-28 RX ORDER — PROPOFOL 10 MG/ML
INJECTION, EMULSION INTRAVENOUS PRN
Status: DISCONTINUED | OUTPATIENT
Start: 2023-09-28 | End: 2023-09-28 | Stop reason: SDUPTHER

## 2023-09-28 RX ORDER — DIPHENHYDRAMINE HYDROCHLORIDE 50 MG/ML
12.5 INJECTION INTRAMUSCULAR; INTRAVENOUS
Status: DISCONTINUED | OUTPATIENT
Start: 2023-09-28 | End: 2023-09-28 | Stop reason: HOSPADM

## 2023-09-28 RX ADMIN — FENTANYL CITRATE 50 MCG: 50 INJECTION, SOLUTION INTRAMUSCULAR; INTRAVENOUS at 07:33

## 2023-09-28 RX ADMIN — HYDROMORPHONE HYDROCHLORIDE 1 MG: 2 INJECTION, SOLUTION INTRAMUSCULAR; INTRAVENOUS; SUBCUTANEOUS at 07:49

## 2023-09-28 RX ADMIN — HYDROMORPHONE HYDROCHLORIDE 0.5 MG: 2 INJECTION, SOLUTION INTRAMUSCULAR; INTRAVENOUS; SUBCUTANEOUS at 09:40

## 2023-09-28 RX ADMIN — ONDANSETRON 4 MG: 2 INJECTION INTRAMUSCULAR; INTRAVENOUS at 10:19

## 2023-09-28 RX ADMIN — SODIUM CHLORIDE: 9 INJECTION, SOLUTION INTRAVENOUS at 07:04

## 2023-09-28 RX ADMIN — MAGNESIUM SULFATE HEPTAHYDRATE 1 G: 500 INJECTION, SOLUTION INTRAMUSCULAR; INTRAVENOUS at 07:45

## 2023-09-28 RX ADMIN — LIDOCAINE HYDROCHLORIDE 100 MG: 20 INJECTION, SOLUTION INFILTRATION; PERINEURAL at 07:33

## 2023-09-28 RX ADMIN — SUGAMMADEX 200 MG: 100 INJECTION, SOLUTION INTRAVENOUS at 09:20

## 2023-09-28 RX ADMIN — SODIUM CHLORIDE: 9 INJECTION, SOLUTION INTRAVENOUS at 08:55

## 2023-09-28 RX ADMIN — ROPIVACAINE HYDROCHLORIDE 30 ML: 5 INJECTION, SOLUTION EPIDURAL; INFILTRATION; PERINEURAL at 07:10

## 2023-09-28 RX ADMIN — Medication 140 MG: at 07:33

## 2023-09-28 RX ADMIN — PROPOFOL 200 MG: 10 INJECTION, EMULSION INTRAVENOUS at 07:33

## 2023-09-28 RX ADMIN — FENTANYL CITRATE 50 MCG: 50 INJECTION, SOLUTION INTRAMUSCULAR; INTRAVENOUS at 07:37

## 2023-09-28 RX ADMIN — Medication 100 MCG: at 09:26

## 2023-09-28 RX ADMIN — MIDAZOLAM 2 MG: 1 INJECTION INTRAMUSCULAR; INTRAVENOUS at 07:12

## 2023-09-28 RX ADMIN — ROCURONIUM BROMIDE 5 MG: 10 INJECTION, SOLUTION INTRAVENOUS at 07:33

## 2023-09-28 RX ADMIN — ONDANSETRON 4 MG: 2 INJECTION INTRAMUSCULAR; INTRAVENOUS at 07:42

## 2023-09-28 RX ADMIN — ROCURONIUM BROMIDE 10 MG: 10 INJECTION, SOLUTION INTRAVENOUS at 08:18

## 2023-09-28 RX ADMIN — CEFAZOLIN 2000 MG: 2 INJECTION, POWDER, FOR SOLUTION INTRAMUSCULAR; INTRAVENOUS at 07:22

## 2023-09-28 RX ADMIN — ACETAMINOPHEN 650 MG: 325 TABLET ORAL at 07:02

## 2023-09-28 RX ADMIN — OXYCODONE HYDROCHLORIDE 5 MG: 5 TABLET ORAL at 10:26

## 2023-09-28 RX ADMIN — DEXAMETHASONE SODIUM PHOSPHATE 4 MG: 4 INJECTION, SOLUTION INTRAMUSCULAR; INTRAVENOUS at 07:42

## 2023-09-28 RX ADMIN — ROCURONIUM BROMIDE 45 MG: 10 INJECTION, SOLUTION INTRAVENOUS at 07:42

## 2023-09-28 RX ADMIN — APREPITANT 40 MG: 40 CAPSULE ORAL at 07:02

## 2023-09-28 RX ADMIN — HYDROMORPHONE HYDROCHLORIDE 0.5 MG: 2 INJECTION, SOLUTION INTRAMUSCULAR; INTRAVENOUS; SUBCUTANEOUS at 09:28

## 2023-09-28 ASSESSMENT — PAIN SCALES - GENERAL
PAINLEVEL_OUTOF10: 0
PAINLEVEL_OUTOF10: 0
PAINLEVEL_OUTOF10: 3

## 2023-09-28 ASSESSMENT — PAIN DESCRIPTION - PAIN TYPE: TYPE: SURGICAL PAIN

## 2023-09-28 ASSESSMENT — PAIN DESCRIPTION - LOCATION: LOCATION: KNEE

## 2023-09-28 ASSESSMENT — PAIN DESCRIPTION - DESCRIPTORS: DESCRIPTORS: DISCOMFORT

## 2023-09-28 ASSESSMENT — PAIN DESCRIPTION - ORIENTATION: ORIENTATION: LEFT

## 2023-09-28 NOTE — PROGRESS NOTES
Pt awake and alert at this time. Pt on RA, and VSS. Pt medicated for pain and nausea, tolerating PO. Skin warm and dry, palpable pulses and able to wiggle toes. Pt meets criteria to be discharged from Phase 1.

## 2023-09-28 NOTE — BRIEF OP NOTE
Brief Postoperative Note      Patient: Luna Austin  YOB: 1980  MRN: [id number]    Date of Procedure: 9/28/2023    Pre-Op Diagnosis Codes:     * Tears of meniscus and ACL of left knee, initial encounter [S83.207A, S83.512A]     * Tear of lateral meniscus of left knee, unspecified tear type, unspecified whether old or current tear, initial encounter [S83.282A]    Post-Op Diagnosis: Same       Procedure(s):  LEFT KNEE ARTHROSCOPY, ANTERIOR CRUCIATE LIGAMENT RECONSTRUCTION, RECONSTRUCTION WITH HAMSTRING TENDON ALLOGRAFT AND PARTIAL LATERAL MENISCECTOMY    Surgeon(s):  Ej Tyson MD    Assistant:  First Assistant: Stanley Warren    Anesthesia: General + adductor canal block    Estimated Blood Loss (mL): Minimal    Complications: None    Specimens:   * No specimens in log *    Implants:  Implant Name Type Inv.  Item Serial No.  Lot No. LRB No. Used Action   ALLOGRAFT GRAFTLINK: D=9.5 L=77 MM - [id number]  ALLOGRAFT GRAFTLINK: D=9.5 L=77 MM [id number] [employer]  Left 1 Implanted   BTB IB TIGHTROPE W FLIPCUTTER III DRILL - [id number]  BTB IB Mabeline Delude III DRILL  [employer] [id number] Left 1 Implanted   ANCHOR BONE OPEN ABS TIGHTROPE 2 - [id number]  ANCHOR BONE OPEN ABS TIGHTROPE 2  [employer] [id number] Left 1 Implanted   TIGHTROPE ABS BUTTON CONCAVE 11MM    [employer] [id number] Left 1 Implanted   DEVICE GRFT FIX 4.75X19.1 MM BIOCOMPOSITE SWIVELOCK - [id number]  DEVICE GRFT FIX 4.75X19.1 MM BIOCOMPOSITE Angela [id number] Left 1 Implanted           Electronically signed by Ej Tyson MD on 9/28/2023 at 9:24 AM

## 2023-09-28 NOTE — PROGRESS NOTES
Discharge instructions reviewed with patient and pts father Mary Kothari. All home medications have been reviewed, pt v/u. Discharge instructions signed. Pt discharged via wheelchair. Pt discharged with crutches, knee immobilizer and all belongings. Father Mary Kothari taking stable pt home.

## 2023-09-28 NOTE — PROGRESS NOTES
Nerve block procedure completed. Patient tolerated well. /98. P83. SaO2 100% on O2 at 2L/NC. Patient's dad brought to bedside.

## 2023-09-28 NOTE — ANESTHESIA POSTPROCEDURE EVALUATION
Department of Anesthesiology  Postprocedure Note    Patient: Zay Toscano  MRN: [id number]  YOB: 1980  Date of evaluation: 9/28/2023      Procedure Summary     Date: 09/28/23 Room / Location: [address]    Anesthesia Start: 3396 Anesthesia Stop: 7896    Procedure: LEFT KNEE ARTHROSCOPY, ANTERIOR CRUCIATE LIGAMENT RECONSTRUCTION, RECONSTRUCTION WITH QUADRICEPS TENDON ALLOGRAFT AND PARTIAL LATERAL MENISCECTOMY VERSUS REPAIR-ADDUCTOR BLOCK-ARTHREX (Left: Knee) Diagnosis:       Tears of meniscus and ACL of left knee, initial encounter      Tear of lateral meniscus of left knee, unspecified tear type, unspecified whether old or current tear, initial encounter      (Tears of meniscus and ACL of left knee, initial encounter [S83.207A, S83.512A])      (Tear of lateral meniscus of left knee, unspecified tear type, unspecified whether old or current tear, initial encounter [L96.046G])    Surgeons: Brando Kincaid MD Responsible Provider: Tamar Wakefield MD    Anesthesia Type: general ASA Status: 2          Anesthesia Type: No value filed.     Siena Phase I: Siena Score: 10    Siena Phase II:        Anesthesia Post Evaluation    Patient location during evaluation: PACU  Patient participation: complete - patient participated  Level of consciousness: awake and alert  Airway patency: patent  Nausea & Vomiting: no nausea and no vomiting  Complications: no  Cardiovascular status: blood pressure returned to baseline  Respiratory status: acceptable  Hydration status: euvolemic  Multimodal analgesia pain management approach  Pain management: adequate

## 2023-09-28 NOTE — PROGRESS NOTES
Pt arrived from OR to PACU bay 2. Report received from OR staff. Pt not yet arousable to voice. Surgical incisions dressings in place to L knee. Pt on 6L 02, NSR, and VSS. Will continue to monitor.

## 2023-09-28 NOTE — OP NOTE
Maeve Garcia (1980)    9/28/2023    Preoperative Diagnosis-  Left knee ACL tear, bucket-handle lateral meniscus tear    Postoperative Diagnosis-  Left knee ACL tear, lateral meniscus tear, grade II chondromalacia patella    Procedure-  Left knee diagnostic arthroscopy, ACL reconstruction with hamstring tendon allograft, partial lateral meniscectomy      Surgeon-  John Scott MD    Assistant(s)-Delaney Warren    Anesthesia- General and abductor canal block    EBL-  minimal    Complications-  none    Disposition-  To PACU in stable condition      Indications for Procedure  The patient was seen in the office for left knee pain. Patient sustained an injury when she was rollerblading on a wet bridge and slipped and fell. MRI of her left knee demonstrated ACL tear, MCL tear and bucket-handle tear of the lateral meniscus and suspected pre-existing discoid morphology. We discussed both nonoperative and operative treatment options, with operative treatment recommended  We discussed the risks, benefits, and complications to the procedure as well as alternatives and the risks related to not receiving the proposed care, treatment. The patient subsequently provided written informed consent for the procedure. Site Marking and Surgical Prep  The patient was seen in the holding area and the operative extremity was marked. Patient was seen by the anesthesia service. The patient was then brought to the operating room, identified, transferred onto the operating room table in the supine position. The patient was then induced under general anesthesia. The patient received prophylactic preoperative IV antibiotics and had DVT prophylaxis with sequential compression device on the nonoperative lower extremity. A Lachman exam was performed, and was positive. The operative extremity was then sterilely prepped and draped in the usual fashion.   A timeout was taken with the patient, the operative extremity, and

## 2023-09-28 NOTE — ANESTHESIA PROCEDURE NOTES
Peripheral Block    Patient location during procedure: holding area  Reason for block: post-op pain management and at surgeon's request  Start time: 9/28/2023 7:10 AM  End time: 9/28/2023 7:16 AM  Staffing  Performed: anesthesiologist   Anesthesiologist: Tamar Wakefield MD  Performed by: Tamar Wakefield MD  Authorized by: Tamar Wakefield MD    Preanesthetic Checklist  Completed: patient identified, IV checked, site marked, risks and benefits discussed, surgical/procedural consents, equipment checked, pre-op evaluation, timeout performed, anesthesia consent given, oxygen available and monitors applied/VS acknowledged  Peripheral Block   Patient position: supine  Prep: ChloraPrep  Provider prep: mask and sterile gloves  Patient monitoring: cardiac monitor, continuous pulse ox, continuous capnometry, frequent blood pressure checks and IV access  Block type: Femoral  Adductor canal  Laterality: left  Injection technique: single-shot  Guidance: ultrasound guided    Needle   Needle type: combined needle/nerve stimulator   Needle gauge: 22 G  Needle localization: anatomical landmarks and ultrasound guidance  Needle length: 10 cm  Assessment   Injection assessment: negative aspiration for heme, no paresthesia on injection and local visualized surrounding nerve on ultrasound  Paresthesia pain: none  Slow fractionated injection: yes  Hemodynamics: stable  Real-time US image taken/store: yes    Medications Administered  ropivacaine (NAROPIN) injection 0.5% - Perineural   30 mL - 9/28/2023 7:10:00 AM

## 2023-09-29 ENCOUNTER — APPOINTMENT (OUTPATIENT)
Dept: PHYSICAL THERAPY | Age: 43
End: 2023-09-29
Payer: COMMERCIAL

## 2023-09-29 ENCOUNTER — OFFICE VISIT (OUTPATIENT)
Dept: ORTHOPEDIC SURGERY | Age: 43
End: 2023-09-29

## 2023-09-29 VITALS — WEIGHT: 182 LBS | RESPIRATION RATE: 16 BRPM | BODY MASS INDEX: 33.49 KG/M2 | HEIGHT: 62 IN

## 2023-09-29 DIAGNOSIS — S83.512A RUPTURE OF ANTERIOR CRUCIATE LIGAMENT OF LEFT KNEE, INITIAL ENCOUNTER: Primary | ICD-10-CM

## 2023-09-29 PROCEDURE — 99024 POSTOP FOLLOW-UP VISIT: CPT | Performed by: STUDENT IN AN ORGANIZED HEALTH CARE EDUCATION/TRAINING PROGRAM

## 2023-09-29 NOTE — PROGRESS NOTES
driving while on pain medications if it causes impairment. No driving until able to move leg to use gas / brake. Patient may start showering now. Cover with plastic bag for 3 days. No baths or soaks. Can leave open to air or apply band-aids to the incisions when out of Tegaderm. Return to office at the 2 week postop time period for suture removal and evaluation of progress. Lelo Chavez PA-C  Board Certified by the [facility] on Certification of [address] [place] [address] and Orthopedics         This note was generated with use of a verbal recognition program [facility]) and was checked for errors. It is possible that there are still dictated errors within this office note. If so, please bring any errors to my attention for an addendum. All efforts were made to ensure that this office note is accurate.

## 2023-10-01 ENCOUNTER — PATIENT MESSAGE (OUTPATIENT)
Dept: ORTHOPEDIC SURGERY | Age: 43
End: 2023-10-01

## 2023-10-01 DIAGNOSIS — K59.00 INFREQUENT BOWEL MOVEMENTS: Primary | ICD-10-CM

## 2023-10-02 ENCOUNTER — HOSPITAL ENCOUNTER (OUTPATIENT)
Dept: PHYSICAL THERAPY | Age: 43
Setting detail: THERAPIES SERIES
Discharge: HOME | End: 2023-10-02
Payer: COMMERCIAL

## 2023-10-02 PROCEDURE — 97164 PT RE-EVAL EST PLAN CARE: CPT

## 2023-10-02 PROCEDURE — 97140 MANUAL THERAPY 1/> REGIONS: CPT

## 2023-10-02 PROCEDURE — 97112 NEUROMUSCULAR REEDUCATION: CPT

## 2023-10-02 RX ORDER — DOCUSATE SODIUM 100 MG/1
100 CAPSULE, LIQUID FILLED ORAL 2 TIMES DAILY
Qty: 28 CAPSULE | Refills: 0 | Status: SHIPPED | OUTPATIENT
Start: 2023-10-02 | End: 2023-10-16

## 2023-10-02 NOTE — PLAN OF CARE
outcome. GOALS     Patient stated goal: return to driving, stair navigation, hiking and work without issues  [] Progressing: [] Met: [] Not Met: [] Adjusted     Therapist goals for Patient:   Short Term Goals: To be achieved in: 4 weeks  1. Independent in HEP and progression per patient tolerance, in order to prevent re-injury. [] Progressing: [] Met: [] Not Met: [] Adjusted  2. Patient will have a decrease in pain to facilitate improvement in movement, function, and ADLs as indicated by Functional Deficits. [] Progressing: [] Met: [] Not Met: [] Adjusted     Long Term Goals: To be achieved in: 12 weeks  1. Pt will improve LEFS by 9 points to reduce disability and progress towards PLOF. [] Progressing: [] Met: [] Not Met: [] Adjusted  2. Patient will demonstrate increased AROM to 0-125 to allow for proper joint functioning as needed to get in/out of her car   [] Progressing: [] Met: [] Not Met: [] Adjusted  3. Patient will demonstrate an increase in Strength to at least 4/5 as well as good proximal hip strength and control to allow for increased quad strength needed for ambulation with LRAD and WBAT with household distances  [] Progressing: [] Met: [] Not Met: [] Adjusted  4. Patient will return to functional activities including full return to work without increased symptoms or restriction. [] Progressing: [] Met: [] Not Met: [] Adjusted  5. Patient will be able to return to outdoor hiking less than 1 mile without limitations or issues. [] Progressing: [] Met: [] Not Met: [] Adjusted    TREATMENT PLAN     Frequency/Duration: 2-3x/week for  12  weeks for the following treatment interventions:    Interventions:  [x] Therapeutic exercise including: strength training, ROM, including postural re-education. [x] NMR activation and proprioception, including postural re-education.     [x] Manual therapy as indicated to include: PROM, Gr I-IV mobilizations, STM, and Dry Needling/IASTM  [x] Modalities as needed

## 2023-10-02 NOTE — TELEPHONE ENCOUNTER
From: Viraj Borges  To: Dr. Guidry Mill: 10/1/2023 11:44 PM EDT  Subject: Constipation from pain killers    Hello there, I have not had a bowel movement since my surgery Thursday. I tried an OTC women's lax and it did not work. Can you prescribe something to help me go?

## 2023-10-02 NOTE — FLOWSHEET NOTE
return to outdoor hiking less than 1 mile without limitations or issues. [] Progressing: [] Met: [] Not Met: [] Adjusted       Overall Progression Towards Functional goals/ Treatment Progress Update:  [] Patient is progressing as expected towards functional goals listed. [] Progression is slowed due to complexities/Impairments listed. [] Progression has been slowed due to co-morbidities. [x] Plan just implemented, too soon (<30days) to assess goals progression   [] Goals require adjustment due to lack of progress  [] Patient is not progressing as expected and requires additional follow up with physician  [] Other:     CHARGE CAPTURE     CHARGE GRID   CPT Code (TIMED) minutes # CPT Code (UNTIMED) #     [] Therex (97072)  5   [] EVAL:     [x] Neuromusc. Re-ed (39724) 10 1  [x] Re-Eval (08596) 1    [x] Manual (03880) 8 1  [] Estim Unattended (16381)     [] Ther. Act (35174) 5   [] Mech. Traction (35839)     [] Gait (26105)    [] Dry Needle 1-2 muscle (29900)     [] Aquatic Therex (36316)    [] Dry Needle 3+ muscle (57884)     [] Iontophoresis (94573)    [] VASO (23077)     [] Ultrasound (13001)    [] Group Therapy (31607)     [] Estim Attended (56895)    [] Other: Total Timed Code Tx Minutes 28        Total Treatment Minutes 48        Charge Justification:  (66968) THERAPEUTIC EXERCISE - Provided verbal/tactile cueing for activities related to strengthening, flexibility, endurance, ROM performed to prevent loss of range of motion, maintain or improve muscular strength or increase flexibility, following either an injury or surgery. (16632) [facility]- Reviewed/Progressed HEP activities related to strengthening, flexibility, endurance, ROM performed to prevent loss of range of motion, maintain or improve muscular strength or increase flexibility, following either an injury or surgery.   (31911) NEUROMUSCULAR RE-EDUCATION- Therapeutic procedure, 1 or more areas, each 15 minutes; neuromuscular

## 2023-10-05 ENCOUNTER — HOSPITAL ENCOUNTER (OUTPATIENT)
Dept: PHYSICAL THERAPY | Age: 43
Setting detail: THERAPIES SERIES
Discharge: HOME | End: 2023-10-05
Payer: COMMERCIAL

## 2023-10-05 PROCEDURE — 97016 VASOPNEUMATIC DEVICE THERAPY: CPT

## 2023-10-05 PROCEDURE — 97140 MANUAL THERAPY 1/> REGIONS: CPT

## 2023-10-05 PROCEDURE — 97112 NEUROMUSCULAR REEDUCATION: CPT

## 2023-10-05 PROCEDURE — 97110 THERAPEUTIC EXERCISES: CPT

## 2023-10-05 NOTE — FLOWSHEET NOTE
weights, progress reps, and continue current phase     Electronically Signed by Mary Guy PT , DPT             Date: 10/05/2023     Note: If patient does not return for scheduled/recommended follow up visits, this note will serve as a discharge from care along with the most recent update on progress.

## 2023-10-10 ENCOUNTER — HOSPITAL ENCOUNTER (OUTPATIENT)
Dept: PHYSICAL THERAPY | Age: 43
Setting detail: THERAPIES SERIES
Discharge: HOME | End: 2023-10-10
Payer: COMMERCIAL

## 2023-10-10 PROCEDURE — 97112 NEUROMUSCULAR REEDUCATION: CPT | Performed by: SPECIALIST/TECHNOLOGIST

## 2023-10-10 PROCEDURE — 97140 MANUAL THERAPY 1/> REGIONS: CPT | Performed by: SPECIALIST/TECHNOLOGIST

## 2023-10-10 PROCEDURE — 97016 VASOPNEUMATIC DEVICE THERAPY: CPT | Performed by: SPECIALIST/TECHNOLOGIST

## 2023-10-10 PROCEDURE — 97110 THERAPEUTIC EXERCISES: CPT | Performed by: SPECIALIST/TECHNOLOGIST

## 2023-10-10 NOTE — FLOWSHEET NOTE
[address] [facility] [place], [address]  [facility] office: [phone] fax: [phone]      Physical Therapy: TREATMENT/PROGRESS NOTE   Patient: Viraj Borges (77 y.o. female)   Treatment Date: 10/10/2023   :  1980 MRN: [id number]   Visit #: 28    Insurance: Payor: UMR / Plan: UMR / Product Type: *No Product type* /   Insurance ID: [id number] - (Commercial)  Secondary Insurance (if applicable):    Treatment Diagnosis:   Decreased L knee ROM, strength, muscle activation, swelling, weight bearing tolerance and control limiting L LE usage with ADL, IADL, Recreational and work activity following Left ACL knee surgery on 2023   Medical Diagnosis:    Rupture of anterior cruciate ligament of left knee, initial encounter [Z55.923P]   Referring Physician: Kym Sacuedo MD  PCP: Alyx Lopez, [address] [place] [address] signed (Y/N): yes    Date of Patient follow up with Physician:      Progress Report/POC: NO     POC update due: (10 visits or 30 days whichever is less OR AUTH LIMITs):     Visit # Insurance Allowable Auth Needed   28 from re-ev with prehab included 60 per calendar year []Yes    [x]No     Latex Allergy:  [x]NO      []YES    Preferred Language for Healthcare:   [x]English       []other:    SUBJECTIVE EXAMINATION     Patient Report/Comments:1+ weeks s/p Pt reports being frustrated with process and needing help. Pt. Has being using a wedge to help elevate her leg which has helped swelling. Pt been attempting to maintain with HEP. OBJECTIVE EXAMINATION     Observation:   10/10 Poor quad contraction.   Left knee ROM 0-100.   10/5: mild increased LLE swelling knee to ankle, - Homans, no TTP posterior calf    Test measurements:     Test used Initial score 10/10/2023   Pain Summary VAS 0    Functional questionnaire LEFS 3/80, 96.25% dysfunction    ROM   2-90     See eval                Strength  See eval

## 2023-10-12 ENCOUNTER — OFFICE VISIT (OUTPATIENT)
Dept: ORTHOPEDIC SURGERY | Age: 43
End: 2023-10-12

## 2023-10-12 VITALS — HEIGHT: 62 IN | WEIGHT: 182 LBS | BODY MASS INDEX: 33.49 KG/M2

## 2023-10-12 DIAGNOSIS — S83.512A RUPTURE OF ANTERIOR CRUCIATE LIGAMENT OF LEFT KNEE, INITIAL ENCOUNTER: Primary | ICD-10-CM

## 2023-10-12 PROCEDURE — 99024 POSTOP FOLLOW-UP VISIT: CPT | Performed by: ORTHOPAEDIC SURGERY

## 2023-10-12 RX ORDER — OXYCODONE HYDROCHLORIDE AND ACETAMINOPHEN 5; 325 MG/1; MG/1
1 TABLET ORAL 2 TIMES DAILY PRN
Qty: 14 TABLET | Refills: 0 | Status: SHIPPED | OUTPATIENT
Start: 2023-10-12 | End: 2023-10-19

## 2023-10-13 ENCOUNTER — HOSPITAL ENCOUNTER (OUTPATIENT)
Dept: PHYSICAL THERAPY | Age: 43
Setting detail: THERAPIES SERIES
Discharge: HOME | End: 2023-10-13
Payer: COMMERCIAL

## 2023-10-13 PROCEDURE — 97112 NEUROMUSCULAR REEDUCATION: CPT

## 2023-10-13 PROCEDURE — 97110 THERAPEUTIC EXERCISES: CPT

## 2023-10-13 PROCEDURE — 97016 VASOPNEUMATIC DEVICE THERAPY: CPT

## 2023-10-13 NOTE — FLOWSHEET NOTE
bending, lifting, catching, throwing, pushing, pulling, jumping.)  Direct, one on one contact, billed in 15-minute increments. (68352) MANUAL THERAPY-  Manual therapy techniques, 1 or more regions, each 15 minutes (Mobilization/manipulation, manual lymphatic drainage, manual traction) for the purpose of modulating pain, promoting relaxation,  increasing ROM, reducing/eliminating soft tissue swelling/inflammation/restriction, improving soft tissue extensibility and allowing for proper ROM for normal function with self care, mobility, lifting and ambulation  (66013) GAIT RE-EDUCATION- Provided training and instruction to the patient for proper joint and muscle recruitment and positioning and eccentric body weight control with ambulation re-education including up and down stairs. Therapeutic procedure, one or more areas, each 15 minutes;   ([phone]) VASOPNEUMATIC  (91496) ATTENDED ESTIM. Application of a modality to 1 or more areas; electrical stimulation (manual), each 15 minutes. Attended electrical stimulation requires direct (1-on-1) contact with the patient by the qualified professional/qualified personnel in providing electrical stimulation manually through the use of probes or other devices. (38902) UNATTENDED ESTIM. Electrical stimulation (unattended), to 1 or more areas for indication(s) other than wound care, as part of a therapy plan of care. ([facility] )    TREATMENT PLAN   Plan: Cont POC- Continue emphasis/focus on exercise progression, improving proper muscle recruitment and activation/motor control patterns, modulating pain, increasing ROM, reduce/eliminate soft tissue swelling/inflammation/restriction, allowing for proper ROM, and kinesthetic sense and proprioception.  Next visit plan to progress weights, progress reps, and continue current phase     Electronically Signed by Anastasia Moraes PT , [id number]            Date: 10/13/2023     Note: If patient does not return for scheduled/recommended follow up

## 2023-10-16 ENCOUNTER — HOSPITAL ENCOUNTER (OUTPATIENT)
Dept: PHYSICAL THERAPY | Age: 43
Setting detail: THERAPIES SERIES
Discharge: HOME | End: 2023-10-16
Payer: COMMERCIAL

## 2023-10-16 PROCEDURE — 97110 THERAPEUTIC EXERCISES: CPT

## 2023-10-16 PROCEDURE — 97016 VASOPNEUMATIC DEVICE THERAPY: CPT

## 2023-10-16 NOTE — FLOWSHEET NOTE
Mallampati #   ASA 2 Post cardiac cath Normal Cors normal   Right groin Perclose no hematoma no bleeding  discussed with Dr Oanh painter to dc home . probes or other devices. (29859) UNATTENDED ESTIM. Electrical stimulation (unattended), to 1 or more areas for indication(s) other than wound care, as part of a therapy plan of care. ([facility] )    TREATMENT PLAN   Plan: Cont POC- Continue emphasis/focus on exercise progression, improving proper muscle recruitment and activation/motor control patterns, modulating pain, increasing ROM, reduce/eliminate soft tissue swelling/inflammation/restriction, allowing for proper ROM, and kinesthetic sense and proprioception. Next visit plan to progress weights, progress reps, and continue current phase     Electronically Signed by Curry Roach PT , DPT [id number]            Date: 10/16/2023     Note: If patient does not return for scheduled/recommended follow up visits, this note will serve as a discharge from care along with the most recent update on progress.

## 2023-10-19 ENCOUNTER — HOSPITAL ENCOUNTER (OUTPATIENT)
Dept: PHYSICAL THERAPY | Age: 43
Setting detail: THERAPIES SERIES
Discharge: HOME | End: 2023-10-19
Payer: COMMERCIAL

## 2023-10-19 PROCEDURE — 97116 GAIT TRAINING THERAPY: CPT

## 2023-10-19 PROCEDURE — 97110 THERAPEUTIC EXERCISES: CPT

## 2023-10-19 PROCEDURE — 97530 THERAPEUTIC ACTIVITIES: CPT

## 2023-10-19 NOTE — FLOWSHEET NOTE
RESTRICTIONS/PRECAUTIONS:     9/28/23: Left ACL reconstruction with hamstring tendon allograft, partial lateral meniscectomy. TDWB 2 weeks, brace ROM unlocked    Exercises/Interventions:    Therapeutic Ex (16832)  resistance Sets/time Reps Notes/Cues/Progressions   Recumbent Bike  5 min             LAQ 5# 10                   Calf stretch ankle propped    Hamstring stretch  2 min              Prone quad stretch  2 min     EOT HSS  2 min     Prone Quad isos/stretch CR  5x10 sec on/off                              Manual Intervention (12712)  TIME         Passive knee extension with therapist OP with calf stretch  5 min                NMR re-education (09284) resistance Sets/time Reps CUES NEEDED                                   Therapeutic Activity (26151)  Sets/time             Leg press  3x10 30#  3x10 50#     TG squats              Gait Training (51296)       Juma step overs  10 min  Cues for heel strike, reducing circumduction for clearance in swing phase, TKE in standing                       Modalities:    Electrical Stimulation:  [employer] E-Stim  Applied to Knee Left (2 2inch x2.5 inch pads) on quad for pain modulation and quadriceps neuromuscular facilitation and symptom control for  10 minutes. Vasopneumatic Compression (Game Ready): Applied to Knee Left for significant edema, swelling, pain control for  5 minutes. Relevant ICD-10 R22.4 Localized swelling of lower limb. 10/13  Girth Left Right Post Vaso   Knee: Midpatella       Knee: suprapatellar 47.5cm  47.3   Knee: 5 cm above 44.8  44.4   Knee: 15 cm above      Ankle: transmalleolar      Ankle: figure 8              Education/Home Exercise Program: Patient HEP program created electronically. Refer to [address] [place] access code:    Access Code: [email or web address]  URL: [email or web address]  Date: 10/02/2023  Prepared by: Mary Guy    Exercises  - Seated Knee Extension Stretch with Chair  - 5 x daily - 7 x weekly - 3-5 min hold  -

## 2023-10-23 ENCOUNTER — HOSPITAL ENCOUNTER (OUTPATIENT)
Dept: PHYSICAL THERAPY | Age: 43
Setting detail: THERAPIES SERIES
Discharge: HOME | End: 2023-10-23
Payer: COMMERCIAL

## 2023-10-23 PROCEDURE — 97110 THERAPEUTIC EXERCISES: CPT

## 2023-10-23 NOTE — FLOWSHEET NOTE
[address] [facility] and Therapy [facility] [place], [address] office: [phone] fax: [phone]      Physical Therapy: TREATMENT/PROGRESS NOTE   Patient: Luna Austin (79 y.o. female)   Treatment Date: 10/23/2023   :  1980 MRN: [id number]   Visit #: 6    Insurance: Payor: UMR / Plan: UMR / Product Type: *No Product type* /   Insurance ID: [id number] - (Commercial)  Secondary Insurance (if applicable):    Treatment Diagnosis:   Decreased L knee ROM, strength, muscle activation, swelling, weight bearing tolerance and control limiting L LE usage with ADL, IADL, Recreational and work activity following Left ACL knee surgery on 2023   Medical Diagnosis:    Rupture of anterior cruciate ligament of left knee, initial encounter [H32.748V]   Referring Physician: Ej Tyson MD  PCP: Izzy Vargas                             Plan of care signed (Y/N): yes    Date of Patient follow up with Physician:      Progress Report/POC: NO     POC update due: (10 visits or 30 days whichever is less OR AUTH LIMITs):     Visit # Insurance Allowable Auth Needed   30  60 per calendar year []Yes    [x]No     Latex Allergy:  [x]NO      []YES    Preferred Language for Healthcare:   [x]English       []other:    SUBJECTIVE EXAMINATION     Patient Report/Comments:  Patient reports no pain but some swelling stiffness. OBJECTIVE EXAMINATION     Observation:   10/19: IND SLR, ambulating with single crutch  10/10 Poor quad contraction.   Left knee ROM 0-100.   10/5: mild increased LLE swelling knee to ankle, - Homans, no TTP posterior calf    Test measurements:     Test used Initial score 10/23/2023   Pain Summary VAS 0 0   Functional questionnaire LEFS 3/80, 96.25% dysfunction    ROM   0-107     See eval                Strength  See eval IND SLR with lag                       RESTRICTIONS/PRECAUTIONS:     23: Left ACL reconstruction with hamstring tendon

## 2023-10-26 ENCOUNTER — HOSPITAL ENCOUNTER (OUTPATIENT)
Dept: PHYSICAL THERAPY | Age: 43
Setting detail: THERAPIES SERIES
Discharge: HOME | End: 2023-10-26
Payer: COMMERCIAL

## 2023-10-26 PROCEDURE — 97116 GAIT TRAINING THERAPY: CPT

## 2023-10-26 PROCEDURE — 97530 THERAPEUTIC ACTIVITIES: CPT

## 2023-10-26 PROCEDURE — 97110 THERAPEUTIC EXERCISES: CPT

## 2023-10-26 NOTE — FLOWSHEET NOTE
[address] [facility] and Therapy [address] [place] [address] [address] office: [phone] fax: [phone]      Physical Therapy: TREATMENT/PROGRESS NOTE   Patient: Yomi Perez (64 y.o. female)   Treatment Date: 10/26/2023   :  1980 MRN: [id number]   Visit #: 7    Insurance: Payor: UMR / Plan: UMR / Product Type: *No Product type* /   Insurance ID: [id number] - (Commercial)  Secondary Insurance (if applicable):    Treatment Diagnosis:   Decreased L knee ROM, strength, muscle activation, swelling, weight bearing tolerance and control limiting L LE usage with ADL, IADL, Recreational and work activity following Left ACL knee surgery on 2023   Medical Diagnosis:    Rupture of anterior cruciate ligament of left knee, initial encounter [P98.687O]   Referring Physician: Dg Nayak MD  PCP: May Lantigua                             Plan of care signed (Y/N): yes    Date of Patient follow up with Physician:      Progress Report/POC: NO     POC update due: (10 visits or 30 days whichever is less OR AUTH LIMITs):     Visit # Insurance Allowable Auth Needed   31  60 per calendar year []Yes    [x]No     Latex Allergy:  [x]NO      []YES    Preferred Language for Healthcare:   [x]English       []other:    SUBJECTIVE EXAMINATION     Patient Report/Comments:  Patient reports she was able to perform the elliptical glider. OBJECTIVE EXAMINATION     Observation:   10/26, 2 stitches at lateral incision removed with suture kit and disposed of in biohazard. Minimal open blood cleaned and 2 steri strips provided. No signs or symptoms of infection. 10/19: IND SLR, ambulating with single crutch  10/10 Poor quad contraction.   Left knee ROM 0-100.   10/5: mild increased LLE swelling knee to ankle, - Homans, no TTP posterior calf    Test measurements:     Test used Initial score 10/26/2023   Pain Summary VAS 0 0   Functional questionnaire LEFS 380, 96.25%

## 2023-10-31 ENCOUNTER — HOSPITAL ENCOUNTER (OUTPATIENT)
Dept: PHYSICAL THERAPY | Age: 43
Setting detail: THERAPIES SERIES
Discharge: HOME | End: 2023-10-31
Payer: COMMERCIAL

## 2023-10-31 PROCEDURE — 97110 THERAPEUTIC EXERCISES: CPT

## 2023-10-31 PROCEDURE — 97112 NEUROMUSCULAR REEDUCATION: CPT

## 2023-10-31 PROCEDURE — 97530 THERAPEUTIC ACTIVITIES: CPT

## 2023-10-31 NOTE — FLOWSHEET NOTE
[address] [place] [address] and Therapy \[facility]\"", [address] office: [phone] fax: [phone]      Physical Therapy: TREATMENT/PROGRESS NOTE   Patient: Usha Ny (20 y.o. female)   Treatment Date: 10/31/2023   :  1980 MRN: [id number]   Visit #: 8   Insurance: Payor: UMR / Plan: UMR / Product Type: *No Product type* /   Insurance ID: [id number] - (Commercial)  Secondary Insurance (if applicable):    Treatment Diagnosis:   Decreased L knee ROM, strength, muscle activation, swelling, weight bearing tolerance and control limiting L LE usage with ADL, IADL, Recreational and work activity following Left ACL knee surgery on 2023   Medical Diagnosis:    Rupture of anterior cruciate ligament of left knee, initial encounter [W01.244W]   Referring Physician: Alondra Cassidy MD  PCP: Nba Donato                             Plan of care signed (Y/N): yes    Date of Patient follow up with Physician:      Progress Report/POC: NO     POC update due: (10 visits or 30 days whichever is less OR AUTH LIMITs):     Visit # Insurance Allowable Auth Needed   31  60 per calendar year []Yes    [x]No     Latex Allergy:  [x]NO      []YES    Preferred Language for Healthcare:   [x]English       []other:    SUBJECTIVE EXAMINATION     Patient Report/Comments:  Patient reports she was able to perform the elliptical glider. OBJECTIVE EXAMINATION     Observation:   10/26, 2 stitches at lateral incision removed with suture kit and disposed of in biohazard. Minimal open blood cleaned and 2 steri strips provided. No signs or symptoms of infection. 10/19: IND SLR, ambulating with single crutch  10/10 Poor quad contraction.   Left knee ROM 0-100.   10/5: mild increased LLE swelling knee to ankle, - Homans, no TTP posterior calf    Test measurements:     Test used Initial score 10/31/2023   Pain Summary VAS 0 0   Functional questionnaire LEFS 3/80, 96.25%

## 2023-11-02 ENCOUNTER — HOSPITAL ENCOUNTER (OUTPATIENT)
Dept: PHYSICAL THERAPY | Age: 43
Setting detail: THERAPIES SERIES
Discharge: HOME | End: 2023-11-02
Payer: COMMERCIAL

## 2023-11-02 PROCEDURE — 97530 THERAPEUTIC ACTIVITIES: CPT

## 2023-11-02 PROCEDURE — 97140 MANUAL THERAPY 1/> REGIONS: CPT

## 2023-11-02 PROCEDURE — 97116 GAIT TRAINING THERAPY: CPT

## 2023-11-02 PROCEDURE — 97110 THERAPEUTIC EXERCISES: CPT

## 2023-11-02 NOTE — PLAN OF CARE
limiting L LE usage with ADL, IADL, Recreational and work activity following Left ACL knee surgery on 9/28/2023   Medical Diagnosis:    Rupture of anterior cruciate ligament of left knee, initial encounter [M63.599B]   Referring Physician: Panchito Caballero MD  PCP: Maryjo Mahan                             Plan of care signed (Y/N): yes    Date of Patient follow up with Physician:      Progress Report/POC: YES     POC update due: (10 visits or 30 days whichever is less OR AUTH LIMITs): 12/2    Visit # Insurance Allowable Auth Needed   32 60 per calendar year []Yes    [x]No     Latex Allergy:  [x]NO      []YES    Preferred Language for Healthcare:   [x]English       []other:    SUBJECTIVE EXAMINATION     Patient Report/Comments:  Patient reports she has been trying to walk without her crutches and does ok til later in the day and she starts to buckle. OBJECTIVE EXAMINATION     Observation:   11/2: incisions closed and healing adequately without signs or symptoms of infection  10/26, 2 stitches at lateral incision removed with suture kit and disposed of in biohazard. Minimal open blood cleaned and 2 steri strips provided. No signs or symptoms of infection. 10/19: IND SLR, ambulating with single crutch  10/10 Poor quad contraction. Left knee ROM 0-100.   10/5: mild increased LLE swelling knee to ankle, - Homans, no TTP posterior calf    Test measurements:     Test used Initial score 11/02/2023   Pain Summary VAS 0 0   Functional questionnaire LEFS 3/80, 96.25% dysfunction 73%   ROM   -3-0-110 PROM     See eval                Strength  See eval IND SLR with lag                       RESTRICTIONS/PRECAUTIONS:     9/28/23: Left ACL reconstruction with hamstring tendon allograft, partial lateral meniscectomy.   TDWB 2 weeks, brace ROM unlocked    Exercises/Interventions:    Therapeutic Ex (56491)  resistance Sets/time Reps Notes/Cues/Progressions   Upright Bike  5 min                 Prone quad stretch and CR

## 2023-11-08 ENCOUNTER — HOSPITAL ENCOUNTER (OUTPATIENT)
Dept: PHYSICAL THERAPY | Age: 43
Setting detail: THERAPIES SERIES
Discharge: HOME | End: 2023-11-08
Payer: COMMERCIAL

## 2023-11-08 PROCEDURE — 97116 GAIT TRAINING THERAPY: CPT

## 2023-11-08 PROCEDURE — 97530 THERAPEUTIC ACTIVITIES: CPT

## 2023-11-08 PROCEDURE — 97110 THERAPEUTIC EXERCISES: CPT

## 2023-11-08 NOTE — FLOWSHEET NOTE
Not Met: [] Adjusted  2. Patient will demonstrate increased AROM to 0-125 to allow for proper joint functioning as needed to get in/out of her car   [x] Progressing: [] Met: [] Not Met: [] Adjusted  3. Patient will demonstrate an increase in Strength to at least 4/5 as well as good proximal hip strength and control to allow for increased quad strength needed for ambulation with LRAD and WBAT with household distances  [x] Progressing: [] Met: [] Not Met: [] Adjusted  4. Patient will return to functional activities including full return to work without increased symptoms or restriction. [x] Progressing: [] Met: [] Not Met: [] Adjusted  5. Patient will be able to return to outdoor hiking less than 1 mile without limitations or issues. [x] Progressing: [] Met: [] Not Met: [] Adjusted  6. Pt will improve LEFS to 70 or more to indicate improved functional mobility to PLOF. [] Progressing: [] Met: [] Not Met: [x] Adjusted/new       Overall Progression Towards Functional goals/ Treatment Progress Update:  [x] Patient is progressing as expected towards functional goals listed. [] Progression is slowed due to complexities/Impairments listed. [] Progression has been slowed due to co-morbidities. [] Plan just implemented, too soon (<30days) to assess goals progression   [] Goals require adjustment due to lack of progress  [] Patient is not progressing as expected and requires additional follow up with physician  [] Other:     CHARGE CAPTURE     CHARGE GRID   CPT Code (TIMED) minutes # CPT Code (UNTIMED) #     [x] Therex (78100)  25 2  [] EVAL:     [] Neuromusc. Re-ed (53492)    [] Re-Eval (46168)     [] Manual (35427)    [] Estim Unattended (97920)     [x] Ther. Act (64351) 16 1  [] [facility]  Traction (80943)     [x] Gait (93414) 14 1  [] Dry Needle 1-2 muscle (62115)     [] Aquatic Therex (67003)    [] Dry Needle 3+ muscle (61390)     [] Iontophoresis (70635)    [] VASO (73458)     [] Ultrasound (05239)    [] Group

## 2023-11-09 ENCOUNTER — OFFICE VISIT (OUTPATIENT)
Dept: ORTHOPEDIC SURGERY | Age: 43
End: 2023-11-09

## 2023-11-09 VITALS — WEIGHT: 183 LBS | BODY MASS INDEX: 33.68 KG/M2 | HEIGHT: 62 IN | RESPIRATION RATE: 16 BRPM

## 2023-11-09 DIAGNOSIS — S83.512A RUPTURE OF ANTERIOR CRUCIATE LIGAMENT OF LEFT KNEE, INITIAL ENCOUNTER: Primary | ICD-10-CM

## 2023-11-09 PROCEDURE — 99024 POSTOP FOLLOW-UP VISIT: CPT | Performed by: STUDENT IN AN ORGANIZED HEALTH CARE EDUCATION/TRAINING PROGRAM

## 2023-11-09 RX ORDER — HYDROCODONE BITARTRATE AND ACETAMINOPHEN 7.5; 325 MG/1; MG/1
1 TABLET ORAL DAILY
Qty: 7 TABLET | Refills: 0 | Status: SHIPPED | OUTPATIENT
Start: 2023-11-09 | End: 2023-11-16

## 2023-11-10 ENCOUNTER — HOSPITAL ENCOUNTER (OUTPATIENT)
Dept: PHYSICAL THERAPY | Age: 43
Setting detail: THERAPIES SERIES
Discharge: HOME | End: 2023-11-10
Payer: COMMERCIAL

## 2023-11-10 PROCEDURE — 97110 THERAPEUTIC EXERCISES: CPT

## 2023-11-10 PROCEDURE — 97530 THERAPEUTIC ACTIVITIES: CPT

## 2023-11-10 NOTE — FLOWSHEET NOTE
[] Ultrasound (12132)    [] Group Therapy (34122)     [] Estim Attended (52400)    [] Other: Total Timed Code Tx Minutes 30        Total Treatment Minutes 30        Charge Justification:  (22805) THERAPEUTIC EXERCISE - Provided verbal/tactile cueing for activities related to strengthening, flexibility, endurance, ROM performed to prevent loss of range of motion, maintain or improve muscular strength or increase flexibility, following either an injury or surgery. (52158) [facility]- Reviewed/Progressed HEP activities related to strengthening, flexibility, endurance, ROM performed to prevent loss of range of motion, maintain or improve muscular strength or increase flexibility, following either an injury or surgery. (52149) THERAPEUTIC ACTIVITY- use of dynamic activities to improve functional performance. (Ex include squatting, ascending/descending stairs, walking, bending, lifting, catching, throwing, pushing, pulling, jumping.)  Direct, one on one contact, billed in 15-minute increments. (22786) UNATTENDED ESTIM. Electrical stimulation (unattended), to 1 or more areas for indication(s) other than wound care, as part of a therapy plan of care. (\[facility]\"" )    TREATMENT PLAN   Plan: Cont POC- Continue emphasis/focus on exercise progression, improving proper muscle recruitment and activation/motor control patterns, modulating pain, increasing ROM, reduce/eliminate soft tissue swelling/inflammation/restriction, allowing for proper ROM, and kinesthetic sense and proprioception. Next visit plan to progress weights, progress reps, and continue current phase     Electronically Signed by Bert Irwin PT , DPT       Date: 11/10/2023     Note: If patient does not return for scheduled/recommended follow up visits, this note will serve as a discharge from care along with the most recent update on progress.

## 2023-11-15 ENCOUNTER — HOSPITAL ENCOUNTER (OUTPATIENT)
Dept: PHYSICAL THERAPY | Age: 43
Setting detail: THERAPIES SERIES
Discharge: HOME | End: 2023-11-15
Payer: COMMERCIAL

## 2023-11-15 PROCEDURE — 97530 THERAPEUTIC ACTIVITIES: CPT

## 2023-11-15 PROCEDURE — 97110 THERAPEUTIC EXERCISES: CPT

## 2023-11-15 NOTE — FLOWSHEET NOTE
proprioception. Next visit plan to progress weights, progress reps, and continue current phase     Electronically Signed by Alex Bearden PT , DPT       Date: 11/15/2023     Note: If patient does not return for scheduled/recommended follow up visits, this note will serve as a discharge from care along with the most recent update on progress.

## 2023-11-21 ENCOUNTER — APPOINTMENT (OUTPATIENT)
Dept: PHYSICAL THERAPY | Age: 43
End: 2023-11-21
Payer: COMMERCIAL

## 2023-11-22 ENCOUNTER — HOSPITAL ENCOUNTER (OUTPATIENT)
Dept: PHYSICAL THERAPY | Age: 43
Setting detail: THERAPIES SERIES
Discharge: HOME | End: 2023-11-22
Payer: COMMERCIAL

## 2023-11-22 PROCEDURE — 97110 THERAPEUTIC EXERCISES: CPT

## 2023-11-22 PROCEDURE — 97530 THERAPEUTIC ACTIVITIES: CPT

## 2023-11-22 NOTE — FLOWSHEET NOTE
therapy and significantly impacts the rate of recovery. Treatment/Activity Tolerance:  [x] Patient tolerated treatment well [x] Patient limited by fatique  [] Patient limited by pain  [] Patient limited by other medical complications  [] Other:     Return to Play: NA    Prognosis for POC: [x] Good [] Fair  [] Poor    Patient requires continued skilled intervention: [x] Yes  [] No      GOALS     Patient stated goal: return to driving, stair navigation, hiking and work without issues  [x] Progressing: [] Met: [] Not Met: [] Adjusted     Therapist goals for Patient:   Short Term Goals: To be achieved in: 4 weeks  1. Independent in HEP and progression per patient tolerance, in order to prevent re-injury. [x] Progressing: [] Met: [] Not Met: [] Adjusted  2. Patient will have a decrease in pain to facilitate improvement in movement, function, and ADLs as indicated by Functional Deficits. [x] Progressing: [] Met: [] Not Met: [] Adjusted     Long Term Goals: To be achieved in: 12 weeks  1. Pt will improve LEFS by 9 points to reduce disability and progress towards PLOF. [] Progressing: [x] Met: [] Not Met: [] Adjusted  2. Patient will demonstrate increased AROM to 0-125 to allow for proper joint functioning as needed to get in/out of her car   [x] Progressing: [] Met: [] Not Met: [] Adjusted  3. Patient will demonstrate an increase in Strength to at least 4/5 as well as good proximal hip strength and control to allow for increased quad strength needed for ambulation with LRAD and WBAT with household distances  [x] Progressing: [] Met: [] Not Met: [] Adjusted  4. Patient will return to functional activities including full return to work without increased symptoms or restriction. [x] Progressing: [] Met: [] Not Met: [] Adjusted  5. Patient will be able to return to outdoor hiking less than 1 mile without limitations or issues. [x] Progressing: [] Met: [] Not Met: [] Adjusted  6.  Pt will improve LEFS to 70 or

## 2023-11-29 ENCOUNTER — APPOINTMENT (OUTPATIENT)
Dept: PHYSICAL THERAPY | Age: 43
End: 2023-11-29
Payer: COMMERCIAL

## 2023-11-29 ENCOUNTER — HOSPITAL ENCOUNTER (OUTPATIENT)
Dept: PHYSICAL THERAPY | Age: 43
Setting detail: THERAPIES SERIES
Discharge: HOME | End: 2023-11-29
Payer: COMMERCIAL

## 2023-11-29 PROCEDURE — 97110 THERAPEUTIC EXERCISES: CPT

## 2023-11-29 PROCEDURE — 97530 THERAPEUTIC ACTIVITIES: CPT

## 2023-11-29 NOTE — FLOWSHEET NOTE
[address] [place] [address] and Therapy [facility], [address] [facility], [address] [place] [address] office: [phone] fax: [phone]      Physical Therapy: TREATMENT/PROGRESS NOTE   Patient: Edel Martinez (57 y.o. female)   Treatment Date: 2023   :  1980 MRN: [id number]   Visit #: 15   Insurance: Payor: UMR / Plan: UMR / Product Type: *No Product type* /   Insurance ID: [id number] - (Commercial)  Secondary Insurance (if applicable):    Treatment Diagnosis:   Decreased L knee ROM, strength, muscle activation, swelling, weight bearing tolerance and control limiting L LE usage with ADL, IADL, Recreational and work activity following Left ACL knee surgery on 2023   Medical Diagnosis:    Rupture of anterior cruciate ligament of left knee, initial encounter [N91.942S]   Referring Physician: Karime Lakhani MD  PCP: Carissa Xie                             Plan of care signed (Y/N): yes    Date of Patient follow up with Physician:      Progress Report/POC: NO     POC update due: (10 visits or 30 days whichever is less OR AUTH LIMITs):     Visit # Insurance Allowable Auth Needed   42 60 per calendar year []Yes    [x]No     Latex Allergy:  [x]NO      []YES    Preferred Language for Healthcare:   [x]English       []other:    SUBJECTIVE EXAMINATION     Patient Report/Comments:  Pt offers no c/o L knee pain with primary issue being LE ms fatigue which she attributes to being busy at work today. OBJECTIVE EXAMINATION     Observation:   11/15: antalgic pattern, lacks LLE knee extension in stance phase, out of hinged brace into knee sleeve OTC model  : incisions closed and healing adequately without signs or symptoms of infection  10/26, 2 stitches at lateral incision removed with suture kit and disposed of in biohazard. Minimal open blood cleaned and 2 steri strips provided. No signs or symptoms of infection.   10/19: IND SLR, ambulating with single crutch  10/10 Poor

## 2023-12-01 ENCOUNTER — APPOINTMENT (OUTPATIENT)
Dept: PHYSICAL THERAPY | Age: 43
End: 2023-12-01
Payer: COMMERCIAL

## 2023-12-04 ENCOUNTER — APPOINTMENT (OUTPATIENT)
Dept: PHYSICAL THERAPY | Age: 43
End: 2023-12-04
Payer: COMMERCIAL

## 2023-12-06 ENCOUNTER — APPOINTMENT (OUTPATIENT)
Dept: PHYSICAL THERAPY | Age: 43
End: 2023-12-06
Payer: COMMERCIAL

## 2023-12-08 ENCOUNTER — HOSPITAL ENCOUNTER (OUTPATIENT)
Dept: PHYSICAL THERAPY | Age: 43
Setting detail: THERAPIES SERIES
Discharge: HOME | End: 2023-12-08
Payer: COMMERCIAL

## 2023-12-08 ENCOUNTER — APPOINTMENT (OUTPATIENT)
Dept: PHYSICAL THERAPY | Age: 43
End: 2023-12-08
Payer: COMMERCIAL

## 2023-12-08 NOTE — FLOWSHEET NOTE
[address], [facility]    Physical Therapy  Cancellation/No-show Note  Patient Name:  Erick Mahmood  :  1980   Date:  2023  Cancelled visits to date: 0  No-shows to date: 1    For today's appointment patient:  []  Cancelled  []  Rescheduled appointment  [x]  No-show     Reason given by patient:  []  Patient ill  []  Conflicting appointment  []  No transportation    []  Conflict with work  []  No reason given  []  Other:     Comments:      Phone call information:   []  Phone call made today to patient at _ time at number provided:      []  Patient answered, conversation as follows:    []  Patient did not answer, message left as follows:  [x]  Phone call not made today  []  Phone call not needed - pt contacted us to cancel and provided reason for cancellation.      Electronically signed by:  RONEN Zazueta,[id number]

## 2023-12-09 ENCOUNTER — APPOINTMENT (OUTPATIENT)
Dept: PHYSICAL THERAPY | Age: 43
End: 2023-12-09
Payer: COMMERCIAL

## 2023-12-11 ENCOUNTER — APPOINTMENT (OUTPATIENT)
Dept: PHYSICAL THERAPY | Age: 43
End: 2023-12-11
Payer: COMMERCIAL

## 2023-12-12 ENCOUNTER — HOSPITAL ENCOUNTER (OUTPATIENT)
Dept: PHYSICAL THERAPY | Age: 43
Setting detail: THERAPIES SERIES
Discharge: HOME | End: 2023-12-12
Payer: COMMERCIAL

## 2023-12-12 PROCEDURE — 97110 THERAPEUTIC EXERCISES: CPT

## 2023-12-12 PROCEDURE — 97530 THERAPEUTIC ACTIVITIES: CPT

## 2023-12-12 NOTE — FLOWSHEET NOTE
Re-ed (07902)    [] Re-Eval (88995)     [] Manual (44047)    [] Estim Unattended (02982)     [x] Ther. Act (41594) 15 1  [] [facility] (04357[facility]     [] Gait (75243)    [] Dry Needle 1-2 muscle (23238)     [] Aquatic Therex (70489)    [] Dry Needle 3+ muscle (10604)     [] Iontophoresis (22103)    [] VASO (39786)     [] Ultrasound (50437)    [] Group Therapy (84140)     [] Estim Attended (26237)    [] Other: Total Timed Code Tx Minutes 45 3       Total Treatment Minutes 45        Charge Justification:  (71397) THERAPEUTIC EXERCISE - Provided verbal/tactile cueing for activities related to strengthening, flexibility, endurance, ROM performed to prevent loss of range of motion, maintain or improve muscular strength or increase flexibility, following either an injury or surgery. (12371) [facility] - Reviewed/Progressed HEP activities related to strengthening, flexibility, endurance, ROM performed to prevent loss of range of motion, maintain or improve muscular strength or increase flexibility, following either an injury or surgery. (55292) THERAPEUTIC ACTIVITY - use of dynamic activities to improve functional performance. (Ex include squatting, ascending/descending stairs, walking, bending, lifting, catching, throwing, pushing, pulling, jumping.)  Direct, one on one contact, billed in 15-minute increments. TREATMENT PLAN   Plan: Cont POC- Continue emphasis/focus on exercise progression, improving proper muscle recruitment and activation/motor control patterns, modulating pain, increasing ROM, reduce/eliminate soft tissue swelling/inflammation/restriction, allowing for proper ROM, and kinesthetic sense and proprioception.  Next visit plan to progress weights, progress reps, and continue current phase     Electronically Signed by Sharath Rice PTA ,ATC      Date: 12/12/2023     Note: If patient does not return for scheduled/recommended follow up visits, this note will serve as a discharge from care

## 2023-12-13 ENCOUNTER — APPOINTMENT (OUTPATIENT)
Dept: PHYSICAL THERAPY | Age: 43
End: 2023-12-13
Payer: COMMERCIAL

## 2023-12-14 ENCOUNTER — HOSPITAL ENCOUNTER (OUTPATIENT)
Dept: PHYSICAL THERAPY | Age: 43
Setting detail: THERAPIES SERIES
Discharge: HOME | End: 2023-12-14
Payer: COMMERCIAL

## 2023-12-14 PROCEDURE — 97530 THERAPEUTIC ACTIVITIES: CPT

## 2023-12-14 PROCEDURE — 97110 THERAPEUTIC EXERCISES: CPT

## 2023-12-14 NOTE — FLOWSHEET NOTE
up with physician  [] Other:     CHARGE CAPTURE     CHARGE GRID   CPT Code (TIMED) minutes # CPT Code (UNTIMED) #     [x] Therex (21160)  30 2  [] EVAL:     [] Neuromusc. Re-ed (14228)    [] Re-Eval (37429)     [] Manual (30280)    [] Estim Unattended (09506)     [x] Ther. Act (88603) 15 1  [] Mech. Traction (37083)     [] Gait (33226)    [] Dry Needle 1-2 muscle (56335)     [] Aquatic Therex (76942)    [] Dry Needle 3+ muscle (49062)     [] Iontophoresis (20864)    [] VASO (09331)     [] Ultrasound (50892)    [] Group Therapy (40433)     [] Estim Attended (23801)    [] Other: Total Timed Code Tx Minutes 45 3       Total Treatment Minutes 45        Charge Justification:  (85855) THERAPEUTIC EXERCISE - Provided verbal/tactile cueing for activities related to strengthening, flexibility, endurance, ROM performed to prevent loss of range of motion, maintain or improve muscular strength or increase flexibility, following either an injury or surgery. (31190) 164 [facility] - Reviewed/Progressed HEP activities related to strengthening, flexibility, endurance, ROM performed to prevent loss of range of motion, maintain or improve muscular strength or increase flexibility, following either an injury or surgery. (87158) THERAPEUTIC ACTIVITY - use of dynamic activities to improve functional performance. (Ex include squatting, ascending/descending stairs, walking, bending, lifting, catching, throwing, pushing, pulling, jumping.)  Direct, one on one contact, billed in 15-minute increments. TREATMENT PLAN   Plan: Cont POC- Continue emphasis/focus on exercise progression, improving proper muscle recruitment and activation/motor control patterns, modulating pain, increasing ROM, reduce/eliminate soft tissue swelling/inflammation/restriction, allowing for proper ROM, and kinesthetic sense and proprioception.  Next visit plan to progress weights, progress reps, and continue current phase     Electronically Signed by

## 2023-12-26 ENCOUNTER — HOSPITAL ENCOUNTER (OUTPATIENT)
Dept: PHYSICAL THERAPY | Age: 43
Setting detail: THERAPIES SERIES
End: 2023-12-26
Payer: COMMERCIAL

## 2024-01-05 ENCOUNTER — HOSPITAL ENCOUNTER (OUTPATIENT)
Dept: PHYSICAL THERAPY | Age: 44
Setting detail: THERAPIES SERIES
End: 2024-01-05
Payer: COMMERCIAL

## 2024-01-10 ENCOUNTER — HOSPITAL ENCOUNTER (OUTPATIENT)
Dept: PHYSICAL THERAPY | Age: 44
Setting detail: THERAPIES SERIES
Discharge: HOME | End: 2024-01-10
Payer: COMMERCIAL

## 2024-01-10 PROCEDURE — 97110 THERAPEUTIC EXERCISES: CPT

## 2024-01-10 PROCEDURE — 97530 THERAPEUTIC ACTIVITIES: CPT

## 2024-01-10 NOTE — PLAN OF CARE
Long Term Goals: To be achieved in: 12 weeks  1. Pt will improve LEFS by 9 points to reduce disability and progress towards PLOF.   [] Progressing: [x] Met: [] Not Met: [] Adjusted  2. Patient will demonstrate increased AROM to 0-125 to allow for proper joint functioning as needed to get in/out of her car   [x] Progressing: [] Met: [] Not Met: [] Adjusted  3. Patient will demonstrate an increase in Strength to at least 4/5 as well as good proximal hip strength and control to allow for increased quad strength needed for ambulation with LRAD and WBAT with household distances  [x] Progressing: [] Met: [] Not Met: [] Adjusted  4. Patient will return to functional activities including full return to work without increased symptoms or restriction.   [x] Progressing: [] Met: [] Not Met: [] Adjusted  5. Patient will be able to return to outdoor hiking less than 1 mile without limitations or issues.     [x] Progressing: [] Met: [] Not Met: [] Adjusted  6. Pt will improve LEFS to 70 or more to indicate improved functional mobility to PLOF.  [x] Progressing: [] Met: [] Not Met: [] Adjusted/new       Overall Progression Towards Functional goals/ Treatment Progress Update:  [] Patient is progressing as expected towards functional goals listed.    [] Progression is slowed due to complexities/Impairments listed.  [x] Progression has been slowed due to co-morbidities.  [] Plan just implemented, too soon (<30days) to assess goals progression   [] Goals require adjustment due to lack of progress  [] Patient is not progressing as expected and requires additional follow up with physician  [] Other:     CHARGE CAPTURE     CHARGE GRID   CPT Code (TIMED) minutes # CPT Code (UNTIMED) #     [x] Therex (53983)  23 2  [] EVAL:     [] Neuromusc. Re-ed (99602)    [] Re-Eval (66711)     [] Manual (19389)    [] Estim Unattended (04847)     [x] Ther. Act (01370) 15 1  [] Mech. Traction (45821)     [] Gait (97197)    [] Dry Needle 1-2 muscle

## 2024-01-11 ENCOUNTER — OFFICE VISIT (OUTPATIENT)
Dept: ORTHOPEDIC SURGERY | Age: 44
End: 2024-01-11
Payer: COMMERCIAL

## 2024-01-11 VITALS — RESPIRATION RATE: 16 BRPM | WEIGHT: 187 LBS | BODY MASS INDEX: 34.41 KG/M2 | HEIGHT: 62 IN

## 2024-01-11 DIAGNOSIS — S83.512A RUPTURE OF ANTERIOR CRUCIATE LIGAMENT OF LEFT KNEE, INITIAL ENCOUNTER: Primary | ICD-10-CM

## 2024-01-11 PROCEDURE — 99213 OFFICE O/P EST LOW 20 MIN: CPT | Performed by: STUDENT IN AN ORGANIZED HEALTH CARE EDUCATION/TRAINING PROGRAM

## 2024-01-11 RX ORDER — METHOCARBAMOL 500 MG/1
500 TABLET, FILM COATED ORAL 4 TIMES DAILY
COMMUNITY

## 2024-01-11 RX ORDER — HYDROXYZINE HYDROCHLORIDE 25 MG/1
25 TABLET, FILM COATED ORAL 3 TIMES DAILY PRN
COMMUNITY

## 2024-01-11 NOTE — PROGRESS NOTES
Knee Arthroscopy Follow-up  Jaycee Sanchez is here for follow up after left knee arthroscopic surgery. Surgery date was 9/28/23. Findings at surgery: Left knee ACL tear, lateral meniscus tear, grade II chondromalacia. Pain is controlled with current analgesics.  Medication(s) being used: percocet. The patient's pain is rated at 0/10. She has been in PT at [facility]. Their most recent progress note states, \"Lacking symmetrical hyperextension compared to non surgical leg. Ambulating with antalgic and stiff leg pattern. She feels like her knee is still swollen and limiting these things. Must consider status of knee prior to surgery in terms of slow progress. All things considered, she is progressing compared to 1 month ago and progressing with her goals.\" The patient did have stiffness and swelling prior to surgery. She feels as though she is progressing but she complains of hamstring and calf tightness and pain. She realizes this is due to her gait pattern and inability to get to full extension when walking.        Physical Examination:  There were no vitals taken for this visit.  Patient is awake, alert, and in no acute distress.  The incisions are healed  Left knee ROM 0-120.  Strength: 5/5 right knee extension, 4/5 left knee        Assessment:   3.5 months status post left knee arthroscopy, ACL reconstruction with hamstring tendon allograft, partial lateral meniscectomy.      Plan:   Continue physical therapy. Discussed continuing home exercises several times a week as well as compliance and timeliness with PT attendance. Discussed possibly incorporate more manual work with PT. I will send them a message regarding dry needling, Graston's, etc.     NSAID's as needed.     Ice or heat as needed.     Follow up in 3 months for re-evaluation.             TANYA Puckett, PA-C  Board Certified by the National Committee on Certification of Physician Assistants  [facility]      This

## 2024-01-12 ENCOUNTER — HOSPITAL ENCOUNTER (OUTPATIENT)
Dept: PHYSICAL THERAPY | Age: 44
Setting detail: THERAPIES SERIES
Discharge: HOME | End: 2024-01-12
Payer: COMMERCIAL

## 2024-01-12 PROCEDURE — 97110 THERAPEUTIC EXERCISES: CPT

## 2024-01-12 PROCEDURE — 97140 MANUAL THERAPY 1/> REGIONS: CPT

## 2024-01-12 NOTE — FLOWSHEET NOTE
[facility]. Traction (01218)     [] Gait (62755)    [] Dry Needle 1-2 muscle (20560)     [] Aquatic Therex (97113)    [] Dry Needle 3+ muscle (20561)     [] Iontophoresis (79484)    [] VASO (27235)     [] Ultrasound (92983)    [] Group Therapy (85412)     [] Estim Attended (83139)    [] Other:    Total Timed Code Tx Minutes 31 2       Total Treatment Minutes 31        Charge Justification:  (28994) THERAPEUTIC EXERCISE - Provided verbal/tactile cueing for activities related to strengthening, flexibility, endurance, ROM performed to prevent loss of range of motion, maintain or improve muscular strength or increase flexibility, following either an injury or surgery.   (89568) HOME EXERCISE PROGRAM - Reviewed/Progressed HEP activities related to strengthening, flexibility, endurance, ROM performed to prevent loss of range of motion, maintain or improve muscular strength or increase flexibility, following either an injury or surgery.  (62714) THERAPEUTIC ACTIVITY - use of dynamic activities to improve functional performance. (Ex include squatting, ascending/descending stairs, walking, bending, lifting, catching, throwing, pushing, pulling, jumping.)  Direct, one on one contact, billed in 15-minute increments.    TREATMENT PLAN   Plan: Cont POC- Continue emphasis/focus on exercise progression, improving proper muscle recruitment and activation/motor control patterns, modulating pain, increasing ROM, reduce/eliminate soft tissue swelling/inflammation/restriction, allowing for proper ROM, and kinesthetic sense and proprioception. Next visit plan to progress weights, progress reps, and continue current phase     Electronically Signed by Ford Ye PT , DPT    Date: 01/12/2024     Note: If patient does not return for scheduled/recommended follow up visits, this note will serve as a discharge from care along with the most recent update on progress.

## 2024-01-16 ENCOUNTER — HOSPITAL ENCOUNTER (OUTPATIENT)
Dept: PHYSICAL THERAPY | Age: 44
Setting detail: THERAPIES SERIES
End: 2024-01-16
Payer: COMMERCIAL

## 2024-01-19 ENCOUNTER — APPOINTMENT (OUTPATIENT)
Dept: PHYSICAL THERAPY | Age: 44
End: 2024-01-19
Payer: COMMERCIAL

## 2024-01-24 ENCOUNTER — APPOINTMENT (OUTPATIENT)
Dept: PHYSICAL THERAPY | Age: 44
End: 2024-01-24
Payer: COMMERCIAL

## 2024-01-26 ENCOUNTER — APPOINTMENT (OUTPATIENT)
Dept: PHYSICAL THERAPY | Age: 44
End: 2024-01-26
Payer: COMMERCIAL

## 2024-04-10 ENCOUNTER — OFFICE VISIT (OUTPATIENT)
Dept: ORTHOPEDIC SURGERY | Age: 44
End: 2024-04-10
Payer: COMMERCIAL

## 2024-04-10 VITALS — HEIGHT: 62 IN | BODY MASS INDEX: 33.86 KG/M2 | RESPIRATION RATE: 16 BRPM | WEIGHT: 184 LBS

## 2024-04-10 DIAGNOSIS — S83.512A RUPTURE OF ANTERIOR CRUCIATE LIGAMENT OF LEFT KNEE, INITIAL ENCOUNTER: Primary | ICD-10-CM

## 2024-04-10 PROCEDURE — 99214 OFFICE O/P EST MOD 30 MIN: CPT | Performed by: STUDENT IN AN ORGANIZED HEALTH CARE EDUCATION/TRAINING PROGRAM

## 2024-04-10 NOTE — PROGRESS NOTES
reconstruction with hamstring tendon allograft, partial lateral meniscectomy.      Plan:   We had a very long discussion about physical therapy and her concerns. I did recommend that she return to physical therapy to try to regain her extension range of motion. I explained that we can switch the office if that would make it more comfortable for her. She requests a female PT. We provided a new order.     NSAID's as needed.     Ice or heat as needed.     Follow up in 2-3 months for re-evaluation.       I spent 30 minutes providing this service today, to include the time spent seeing the patient, documenting, and reviewing chart excluding any separately billed procedures.             TANYA Puckett, PAQuyenC  Board Certified by the National Committee on Certification of Physician Assistants  [facility]      This note was generated with use of a verbal recognition program (DRAGON) and was checked for errors.  It is possible that there are still dictated errors within this office note.  If so, please bring any errors to my attention for an addendum.  All efforts were made to ensure that this office note is accurate.

## 2024-04-16 ENCOUNTER — HOSPITAL ENCOUNTER (OUTPATIENT)
Dept: PHYSICAL THERAPY | Age: 44
Setting detail: THERAPIES SERIES
Discharge: HOME | End: 2024-04-16
Payer: COMMERCIAL

## 2024-04-16 DIAGNOSIS — M25.662 DECREASED RANGE OF MOTION OF LEFT KNEE: ICD-10-CM

## 2024-04-16 DIAGNOSIS — R26.2 DIFFICULTY WALKING: Primary | ICD-10-CM

## 2024-04-16 DIAGNOSIS — M62.81 QUADRICEPS WEAKNESS: ICD-10-CM

## 2024-04-16 PROCEDURE — 97140 MANUAL THERAPY 1/> REGIONS: CPT | Performed by: PHYSICAL THERAPIST

## 2024-04-16 PROCEDURE — 97530 THERAPEUTIC ACTIVITIES: CPT | Performed by: PHYSICAL THERAPIST

## 2024-04-16 PROCEDURE — 97161 PT EVAL LOW COMPLEX 20 MIN: CPT | Performed by: PHYSICAL THERAPIST

## 2024-04-16 PROCEDURE — 20560 NDL INSJ W/O NJX 1 OR 2 MUSC: CPT | Performed by: PHYSICAL THERAPIST

## 2024-04-16 PROCEDURE — 97110 THERAPEUTIC EXERCISES: CPT | Performed by: PHYSICAL THERAPIST

## 2024-04-16 NOTE — PLAN OF CARE
strengthening, flexibility, endurance, ROM performed to prevent loss of range of motion, maintain or improve muscular strength or increase flexibility, following either an injury or surgery.  (21255) THERAPEUTIC ACTIVITY - use of dynamic activities to improve functional performance. (Ex include squatting, ascending/descending stairs, walking, bending, lifting, catching, throwing, pushing, pulling, jumping.)  Direct, one on one contact, billed in 15-minute increments.  (44215) MANUAL THERAPY -  Manual therapy techniques, 1 or more regions, each 15 minutes (Mobilization/manipulation, manual lymphatic drainage, manual traction) for the purpose of modulating pain, promoting relaxation,  increasing ROM, reducing/eliminating soft tissue swelling/inflammation/restriction, improving soft tissue extensibility and allowing for proper ROM for normal function with self care, mobility, lifting and ambulation  (92555) Needle insertion(s) without injection; 1 or 2 muscle(s).  (08014) ATTENDED ESTIM. Application of a modality to 1 or more areas; electrical stimulation (manual), each 15 minutes. Attended electrical stimulation requires direct (1-on-1) contact with the patient by the qualified professional/qualified personnel in providing electrical stimulation manually through the use of probes or other devices.      GOALS     GOALS:  Patient stated goal: walk normally, stairs reciprocally  [] Progressing: [] Met: [] Not Met: [] Adjusted    Therapist goals for Patient:   Short Term Goals: To be achieved in: 2 weeks  1. Independent in HEP and progression per patient tolerance, in order to prevent re-injury.   [] Progressing: [] Met: [] Not Met: [] Adjusted  2. Patient will have a decrease in stiffness/discomfort to <2/10 to facilitate improvement in movement, function, and ADLs as indicated by Functional Deficits.  [] Progressing: [] Met: [] Not Met: [] Adjusted    Long Term Goals: To be achieved in: 12 weeks  1. Disability index

## 2024-04-23 ENCOUNTER — HOSPITAL ENCOUNTER (OUTPATIENT)
Dept: PHYSICAL THERAPY | Age: 44
Setting detail: THERAPIES SERIES
Discharge: HOME | End: 2024-04-23
Payer: COMMERCIAL

## 2024-04-23 PROCEDURE — 97110 THERAPEUTIC EXERCISES: CPT | Performed by: PHYSICAL THERAPIST

## 2024-04-23 PROCEDURE — 97140 MANUAL THERAPY 1/> REGIONS: CPT | Performed by: PHYSICAL THERAPIST

## 2024-04-23 PROCEDURE — 20560 NDL INSJ W/O NJX 1 OR 2 MUSC: CPT | Performed by: PHYSICAL THERAPIST

## 2024-04-23 PROCEDURE — 97032 APPL MODALITY 1+ESTIM EA 15: CPT | Performed by: PHYSICAL THERAPIST

## 2024-04-23 NOTE — FLOWSHEET NOTE
weeks  1. Disability index score of 20% or less for the LEFS to assist with reaching prior level of function with activities such as prolonged sitting for work.  [] Progressing: [] Met: [] Not Met: [] Adjusted  2. Patient will demonstrate increased AROM of L knee extension/flexion to 0-130 without pain to allow for proper joint functioning to enable patient to ambulate without limp.   [] Progressing: [] Met: [] Not Met: [] Adjusted  3. Patient will demonstrate increased Strength of L quad to demonstrate improved muscle activation/motor control at end range extension to allow for proper functional mobility to enable patient to return to hiking.   [] Progressing: [] Met: [] Not Met: [] Adjusted  4. Patient will return to performing reciprocal stairs without increased symptoms or restriction to enable patient to move throughout home.   [] Progressing: [] Met: [] Not Met: [] Adjusted  5. Patient will return to walking 2+ miles for exercise without increased symptoms.   [] Progressing: [] Met: [] Not Met: [] Adjusted     Overall Progression Towards Functional goals/ Treatment Progress Update:  [] Patient is progressing as expected towards functional goals listed.    [] Progression is slowed due to complexities/Impairments listed.  [] Progression has been slowed due to co-morbidities.  [x] Plan just implemented, too soon (<30days) to assess goals progression   [] Goals require adjustment due to lack of progress  [] Patient is not progressing as expected and requires additional follow up with physician  [] Other:     TREATMENT PLAN     Frequency/Duration: 1-2x/week for 12 weeks for the following treatment interventions:    Interventions:  Therapeutic Exercise (25644) including: strength training, ROM, and functional mobility  Therapeutic Activities (54477) including: functional mobility training and education.  Neuromuscular Re-education (69846) activation and proprioception, including postural re-education.    Gait

## 2024-04-29 ENCOUNTER — HOSPITAL ENCOUNTER (OUTPATIENT)
Dept: PHYSICAL THERAPY | Age: 44
Setting detail: THERAPIES SERIES
Discharge: HOME | End: 2024-04-29
Payer: COMMERCIAL

## 2024-04-29 PROCEDURE — 97140 MANUAL THERAPY 1/> REGIONS: CPT | Performed by: PHYSICAL THERAPIST

## 2024-04-29 PROCEDURE — 97110 THERAPEUTIC EXERCISES: CPT | Performed by: PHYSICAL THERAPIST

## 2024-04-29 PROCEDURE — 97032 APPL MODALITY 1+ESTIM EA 15: CPT | Performed by: PHYSICAL THERAPIST

## 2024-04-29 PROCEDURE — 20560 NDL INSJ W/O NJX 1 OR 2 MUSC: CPT | Performed by: PHYSICAL THERAPIST

## 2024-04-29 NOTE — FLOWSHEET NOTE
functional mobility training and education.  Neuromuscular Re-education (28802) activation and proprioception, including postural re-education.    Gait Training (13227) for normalization of ambulation patterns and AD training.   Manual Therapy (48131) as indicated to include: Passive Range of Motion, Gr I-IV mobilizations, Soft Tissue Mobilization, Dry Needling/IASTM, and Myofascial Release  Modalities as needed that may include: Cryotherapy, Electrical Stimulation, Biofeedback, and Vasoneumatic Compression  Patient education on activity modification and progression of HEP    Plan: POC initiated as per evaluation, knee extension and gait focus    Electronically Signed by Becky Sharp, PT  Date: 04/29/2024     Note: Portions of this note have been templated and/or copied from initial evaluation, reassessments and prior notes for documentation efficiency.    Note: If patient does not return for scheduled/recommended follow up visits, this note will serve as a discharge from care along with the most recent update on progress.

## 2024-05-06 ENCOUNTER — HOSPITAL ENCOUNTER (OUTPATIENT)
Dept: PHYSICAL THERAPY | Age: 44
Setting detail: THERAPIES SERIES
Discharge: HOME | End: 2024-05-06
Payer: COMMERCIAL

## 2024-05-06 PROCEDURE — 20560 NDL INSJ W/O NJX 1 OR 2 MUSC: CPT | Performed by: PHYSICAL THERAPIST

## 2024-05-06 PROCEDURE — 97140 MANUAL THERAPY 1/> REGIONS: CPT | Performed by: PHYSICAL THERAPIST

## 2024-05-06 PROCEDURE — 97032 APPL MODALITY 1+ESTIM EA 15: CPT | Performed by: PHYSICAL THERAPIST

## 2024-05-06 PROCEDURE — 97110 THERAPEUTIC EXERCISES: CPT | Performed by: PHYSICAL THERAPIST

## 2024-05-06 NOTE — FLOWSHEET NOTE
[facility] - Outpatient Rehabilitation and Therapy [address], [place] [address] office: [phone] fax: [phone]         Physical Therapy: TREATMENT/PROGRESS NOTE   Patient: Jaycee Sanchez (44 y.o. female)   Examination Date: 2024   :  1980 MRN: [id number]   Visit #: 4   Insurance Allowable Auth Needed   58 []Yes    []No    Insurance: Payor: BCBS / Plan: BCBS - [place] PPO / Product Type: *No Product type* /   Insurance ID: [id number] - ([place] [employer]BS)  Secondary Insurance (if applicable):    Treatment Diagnosis:     ICD-10-CM    1. Difficulty walking  R26.2       2. Quadriceps weakness  M62.81       3. Decreased range of motion of left knee  M25.662          Medical Diagnosis:  Rupture of anterior cruciate ligament of left knee, initial encounter [S83.512A]   Referring Physician: Ashly Fisher PA  PCP: Dagmar Draper     Plan of care signed (Y/N):     Date of Patient follow up with Physician:      Progress Report/POC: NO  POC update due: (10 visits /OR AUTH LIMITS, whichever is less)  2024                                             Precautions/ Contra-indications:           Latex allergy:  NO  Pacemaker:    NO  Contraindications for Manipulation: None  Date of Surgery: 23 Left ACL reconstruction with hamstring tendon allograft, partial lateral meniscectomy   Other:    Red Flags:  None    C-SSRS Triggered by Intake questionnaire:   [x] No, Questionnaire did not trigger screening.   [] Yes, Patient intake triggered further evaluation      [] C-SSRS Screening completed  [] PCP notified via Plan of Care  [] Emergency services notified     Preferred Language for Healthcare:   [x] English       [] other:    SUBJECTIVE EXAMINATION     Patient stated complaint: pt. Reports that her knee is feeling a little better. She feels that it is getting straighter and she is able to walk a little better. She continues to have soreness after completing

## 2024-05-14 ENCOUNTER — HOSPITAL ENCOUNTER (OUTPATIENT)
Dept: PHYSICAL THERAPY | Age: 44
Setting detail: THERAPIES SERIES
Discharge: HOME | End: 2024-05-14
Payer: COMMERCIAL

## 2024-05-14 PROCEDURE — 20560 NDL INSJ W/O NJX 1 OR 2 MUSC: CPT | Performed by: PHYSICAL THERAPIST

## 2024-05-14 PROCEDURE — 97032 APPL MODALITY 1+ESTIM EA 15: CPT | Performed by: PHYSICAL THERAPIST

## 2024-05-14 PROCEDURE — 97110 THERAPEUTIC EXERCISES: CPT | Performed by: PHYSICAL THERAPIST

## 2024-05-14 PROCEDURE — 97140 MANUAL THERAPY 1/> REGIONS: CPT | Performed by: PHYSICAL THERAPIST

## 2024-05-14 NOTE — PLAN OF CARE
per evaluation, knee extension and gait focus    Electronically Signed by Becky Sharp, PT  Date: 05/14/2024     Note: Portions of this note have been templated and/or copied from initial evaluation, reassessments and prior notes for documentation efficiency.    Note: If patient does not return for scheduled/recommended follow up visits, this note will serve as a discharge from care along with the most recent update on progress.

## 2024-05-20 ENCOUNTER — HOSPITAL ENCOUNTER (OUTPATIENT)
Dept: PHYSICAL THERAPY | Age: 44
Setting detail: THERAPIES SERIES
Discharge: HOME | End: 2024-05-20
Payer: COMMERCIAL

## 2024-05-20 PROCEDURE — 97112 NEUROMUSCULAR REEDUCATION: CPT | Performed by: PHYSICAL THERAPIST

## 2024-05-20 PROCEDURE — 97140 MANUAL THERAPY 1/> REGIONS: CPT | Performed by: PHYSICAL THERAPIST

## 2024-05-20 PROCEDURE — 97110 THERAPEUTIC EXERCISES: CPT | Performed by: PHYSICAL THERAPIST

## 2024-05-20 PROCEDURE — 20560 NDL INSJ W/O NJX 1 OR 2 MUSC: CPT | Performed by: PHYSICAL THERAPIST

## 2024-05-20 NOTE — FLOWSHEET NOTE
TREATMENT PLAN     Frequency/Duration: 1-2x/week for 12 weeks for the following treatment interventions:    Interventions:  Therapeutic Exercise (45857) including: strength training, ROM, and functional mobility  Therapeutic Activities (13244) including: functional mobility training and education.  Neuromuscular Re-education (62803) activation and proprioception, including postural re-education.    Gait Training (62366) for normalization of ambulation patterns and AD training.   Manual Therapy (54785) as indicated to include: Passive Range of Motion, Gr I-IV mobilizations, Soft Tissue Mobilization, Dry Needling/IASTM, and Myofascial Release  Modalities as needed that may include: Cryotherapy, Electrical Stimulation, Biofeedback, and Vasoneumatic Compression  Patient education on activity modification and progression of HEP    Plan: POC initiated as per evaluation, knee extension and gait focus    Electronically Signed by Becky Sharp PT  Date: 05/20/2024     Note: Portions of this note have been templated and/or copied from initial evaluation, reassessments and prior notes for documentation efficiency.    Note: If patient does not return for scheduled/recommended follow up visits, this note will serve as a discharge from care along with the most recent update on progress.

## 2024-05-31 ENCOUNTER — HOSPITAL ENCOUNTER (OUTPATIENT)
Dept: PHYSICAL THERAPY | Age: 44
Setting detail: THERAPIES SERIES
Discharge: HOME | End: 2024-05-31
Payer: COMMERCIAL

## 2024-05-31 PROCEDURE — 97032 APPL MODALITY 1+ESTIM EA 15: CPT | Performed by: PHYSICAL THERAPIST

## 2024-05-31 PROCEDURE — 97140 MANUAL THERAPY 1/> REGIONS: CPT | Performed by: PHYSICAL THERAPIST

## 2024-05-31 PROCEDURE — 97110 THERAPEUTIC EXERCISES: CPT | Performed by: PHYSICAL THERAPIST

## 2024-05-31 PROCEDURE — 20560 NDL INSJ W/O NJX 1 OR 2 MUSC: CPT | Performed by: PHYSICAL THERAPIST

## 2024-05-31 NOTE — FLOWSHEET NOTE
[facility] - Outpatient Rehabilitation and Therapy [address], [place] [address] office: [phone] fax: [phone]         Physical Therapy: TREATMENT/PROGRESS NOTE   Patient: Jaycee Sanchez (44 y.o. female)   Examination Date: 2024   :  1980 MRN: [id number]   Visit #: 7   Insurance Allowable Auth Needed   58 []Yes    []No    Insurance: Payor: BCBS / Plan: BCBS - [place] PPO / Product Type: *No Product type* /   Insurance ID: [id number] - ([place] BCBS)  Secondary Insurance (if applicable):    Treatment Diagnosis:     ICD-10-CM    1. Difficulty walking  R26.2       2. Quadriceps weakness  M62.81       3. Decreased range of motion of left knee  M25.662          Medical Diagnosis:  Rupture of anterior cruciate ligament of left knee, initial encounter [S83.512A]   Referring Physician: Ashly Fisher PA  PCP: Dagmar Draper     Plan of care signed (Y/N):     Date of Patient follow up with Physician:      Progress Report/POC: NO  POC update due: (10 visits /OR AUTH LIMITS, whichever is less)  2024                                             Precautions/ Contra-indications:           Latex allergy:  NO  Pacemaker:    NO  Contraindications for Manipulation: None  Date of Surgery: 23 Left ACL reconstruction with hamstring tendon allograft, partial lateral meniscectomy   Other:    Red Flags:  None    C-SSRS Triggered by Intake questionnaire:   [x] No, Questionnaire did not trigger screening.   [] Yes, Patient intake triggered further evaluation      [] C-SSRS Screening completed  [] PCP notified via Plan of Care  [] Emergency services notified     Preferred Language for Healthcare:   [x] English       [] other:    SUBJECTIVE EXAMINATION     Patient stated complaint: pt. States that her hamstring has been a lot mores stiff and sore especially over the past couple of days. She has had some health issues the past week and has done more cleaning

## 2024-06-05 ENCOUNTER — APPOINTMENT (OUTPATIENT)
Dept: PHYSICAL THERAPY | Age: 44
End: 2024-06-05
Payer: COMMERCIAL

## 2024-06-11 ENCOUNTER — APPOINTMENT (OUTPATIENT)
Dept: PHYSICAL THERAPY | Age: 44
End: 2024-06-11
Payer: COMMERCIAL

## 2024-06-18 ENCOUNTER — HOSPITAL ENCOUNTER (OUTPATIENT)
Dept: PHYSICAL THERAPY | Age: 44
Setting detail: THERAPIES SERIES
Discharge: HOME | End: 2024-06-18
Payer: COMMERCIAL

## 2024-06-18 PROCEDURE — 97530 THERAPEUTIC ACTIVITIES: CPT | Performed by: PHYSICAL THERAPIST

## 2024-06-18 PROCEDURE — 97140 MANUAL THERAPY 1/> REGIONS: CPT | Performed by: PHYSICAL THERAPIST

## 2024-06-18 PROCEDURE — 97110 THERAPEUTIC EXERCISES: CPT | Performed by: PHYSICAL THERAPIST

## 2024-06-18 NOTE — PLAN OF CARE
Frequency/Duration: 1-2x/week for 12 weeks for the following treatment interventions:    Interventions:  Therapeutic Exercise (85780) including: strength training, ROM, and functional mobility  Therapeutic Activities (88133) including: functional mobility training and education.  Neuromuscular Re-education (92313) activation and proprioception, including postural re-education.    Gait Training (81391) for normalization of ambulation patterns and AD training.   Manual Therapy (74696) as indicated to include: Passive Range of Motion, Gr I-IV mobilizations, Soft Tissue Mobilization, Dry Needling/IASTM, and Myofascial Release  Modalities as needed that may include: Cryotherapy, Electrical Stimulation, Biofeedback, and Vasoneumatic Compression  Patient education on activity modification and progression of HEP    Plan: POC initiated as per evaluation, knee extension and gait/stairs focus    Electronically Signed by Becky Sharp PT  Date: 06/18/2024     Note: Portions of this note have been templated and/or copied from initial evaluation, reassessments and prior notes for documentation efficiency.    Note: If patient does not return for scheduled/recommended follow up visits, this note will serve as a discharge from care along with the most recent update on progress.

## 2024-06-21 ENCOUNTER — APPOINTMENT (OUTPATIENT)
Dept: PHYSICAL THERAPY | Age: 44
End: 2024-06-21
Payer: COMMERCIAL

## 2024-06-25 ENCOUNTER — HOSPITAL ENCOUNTER (OUTPATIENT)
Dept: PHYSICAL THERAPY | Age: 44
Setting detail: THERAPIES SERIES
Discharge: HOME | End: 2024-06-25
Payer: COMMERCIAL

## 2024-06-25 PROCEDURE — 97140 MANUAL THERAPY 1/> REGIONS: CPT | Performed by: PHYSICAL THERAPIST

## 2024-06-25 PROCEDURE — 97530 THERAPEUTIC ACTIVITIES: CPT | Performed by: PHYSICAL THERAPIST

## 2024-06-25 PROCEDURE — 97110 THERAPEUTIC EXERCISES: CPT | Performed by: PHYSICAL THERAPIST

## 2024-06-25 NOTE — FLOWSHEET NOTE
[x] Progressing: [] Met: [] Not Met: [] Adjusted  5. Patient will return to walking 2+ miles for exercise without increased symptoms.   [x] Progressing: [] Met: [] Not Met: [] Adjusted     Overall Progression Towards Functional goals/ Treatment Progress Update:  [] Patient is progressing as expected towards functional goals listed.    [x] Progression is slowed due to complexities/Impairments listed.  [] Progression has been slowed due to co-morbidities.  [] Plan just implemented, too soon (<30days) to assess goals progression   [] Goals require adjustment due to lack of progress  [] Patient is not progressing as expected and requires additional follow up with physician  [] Other:     TREATMENT PLAN     Frequency/Duration: 1-2x/week for 12 weeks for the following treatment interventions:    Interventions:  Therapeutic Exercise (94007) including: strength training, ROM, and functional mobility  Therapeutic Activities (47779) including: functional mobility training and education.  Neuromuscular Re-education (63821) activation and proprioception, including postural re-education.    Gait Training (80016) for normalization of ambulation patterns and AD training.   Manual Therapy (66764) as indicated to include: Passive Range of Motion, Gr I-IV mobilizations, Soft Tissue Mobilization, Dry Needling/IASTM, and Myofascial Release  Modalities as needed that may include: Cryotherapy, Electrical Stimulation, Biofeedback, and Vasoneumatic Compression  Patient education on activity modification and progression of HEP    Plan: POC initiated as per evaluation, knee extension and gait/stairs focus    Electronically Signed by Becky Sharp, PT  Date: 06/25/2024     Note: Portions of this note have been templated and/or copied from initial evaluation, reassessments and prior notes for documentation efficiency.    Note: If patient does not return for scheduled/recommended follow up visits, this note will serve as a discharge from

## 2024-07-03 ENCOUNTER — HOSPITAL ENCOUNTER (OUTPATIENT)
Dept: PHYSICAL THERAPY | Age: 44
Setting detail: THERAPIES SERIES
Discharge: HOME | End: 2024-07-03
Payer: COMMERCIAL

## 2024-07-03 PROCEDURE — 97110 THERAPEUTIC EXERCISES: CPT | Performed by: PHYSICAL THERAPIST

## 2024-07-03 PROCEDURE — 97140 MANUAL THERAPY 1/> REGIONS: CPT | Performed by: PHYSICAL THERAPIST

## 2024-07-03 PROCEDURE — 97530 THERAPEUTIC ACTIVITIES: CPT | Performed by: PHYSICAL THERAPIST

## 2024-07-03 NOTE — FLOWSHEET NOTE
[facility] [address], [place] [address] office: [phone] fax: [phone]         Physical Therapy: TREATMENT/PROGRESS NOTE   Patient: Jaycee Sanchez (44 y.o. female)   Examination Date: 2024   :  1980 MRN: [id number]   Visit #: 10   Insurance Allowable Auth Needed   58 []Yes    []No    Insurance: Payor: BCBS / Plan: BCBS - [place] PPO / Product Type: *No Product type* /   Insurance ID: [id number] - ([place] [employer])  Secondary Insurance (if applicable):    Treatment Diagnosis:     ICD-10-CM    1. Difficulty walking  R26.2       2. Quadriceps weakness  M62.81       3. Decreased range of motion of left knee  M25.662          Medical Diagnosis:  Rupture of anterior cruciate ligament of left knee, initial encounter [S83.512A]   Referring Physician: Ashly Fisher PA  PCP: Dagmar Draper     Plan of care signed (Y/N): Y    Date of Patient follow up with Physician:      Progress Report/POC: NO  POC update due: (10 visits /OR AUTH LIMITS, whichever is less)  2024                                             Precautions/ Contra-indications:           Latex allergy:  NO  Pacemaker:    NO  Contraindications for Manipulation: None  Date of Surgery: 23 Left ACL reconstruction with hamstring tendon allograft, partial lateral meniscectomy   Other:    Red Flags:  None    C-SSRS Triggered by Intake questionnaire:   [x] No, Questionnaire did not trigger screening.   [] Yes, Patient intake triggered further evaluation      [] C-SSRS Screening completed  [] PCP notified via Plan of Care  [] Emergency services notified     Preferred Language for Healthcare:   [x] English       [] other:    SUBJECTIVE EXAMINATION     Patient stated complaint: Pt. Reports that her knee continues to feel a little better with walking. Pt. Notes that her knee is stiff this morning because it is still early. Pt. Continues to have

## 2024-07-08 ENCOUNTER — HOSPITAL ENCOUNTER (OUTPATIENT)
Dept: PHYSICAL THERAPY | Age: 44
Setting detail: THERAPIES SERIES
Discharge: HOME | End: 2024-07-08
Payer: COMMERCIAL

## 2024-07-08 PROCEDURE — 97110 THERAPEUTIC EXERCISES: CPT | Performed by: PHYSICAL THERAPIST

## 2024-07-08 PROCEDURE — 97530 THERAPEUTIC ACTIVITIES: CPT | Performed by: PHYSICAL THERAPIST

## 2024-07-08 PROCEDURE — 97140 MANUAL THERAPY 1/> REGIONS: CPT | Performed by: PHYSICAL THERAPIST

## 2024-07-08 NOTE — FLOWSHEET NOTE
reciprocal stairs without increased symptoms or restriction to enable patient to move throughout home.   [x] Progressing: [] Met: [] Not Met: [] Adjusted  5. Patient will return to walking 2+ miles for exercise without increased symptoms.   [x] Progressing: [] Met: [] Not Met: [] Adjusted     Overall Progression Towards Functional goals/ Treatment Progress Update:  [] Patient is progressing as expected towards functional goals listed.    [x] Progression is slowed due to complexities/Impairments listed.  [] Progression has been slowed due to co-morbidities.  [] Plan just implemented, too soon (<30days) to assess goals progression   [] Goals require adjustment due to lack of progress  [] Patient is not progressing as expected and requires additional follow up with physician  [] Other:     TREATMENT PLAN     Frequency/Duration: 1-2x/week for 12 weeks for the following treatment interventions:    Interventions:  Therapeutic Exercise (42195) including: strength training, ROM, and functional mobility  Therapeutic Activities (83062) including: functional mobility training and education.  Neuromuscular Re-education (57166) activation and proprioception, including postural re-education.    Gait Training (72987) for normalization of ambulation patterns and AD training.   Manual Therapy (83225) as indicated to include: Passive Range of Motion, Gr I-IV mobilizations, Soft Tissue Mobilization, Dry Needling/IASTM, and Myofascial Release  Modalities as needed that may include: Cryotherapy, Electrical Stimulation, Biofeedback, and Vasoneumatic Compression  Patient education on activity modification and progression of HEP    Plan: POC initiated as per evaluation, knee extension and gait/stairs focus    Electronically Signed by Becky Sharp PT  Date: 07/08/2024     Note: Portions of this note have been templated and/or copied from initial evaluation, reassessments and prior notes for documentation efficiency.    Note: If patient

## 2024-07-16 ENCOUNTER — HOSPITAL ENCOUNTER (OUTPATIENT)
Dept: PHYSICAL THERAPY | Age: 44
Setting detail: THERAPIES SERIES
Discharge: HOME | End: 2024-07-16
Payer: COMMERCIAL

## 2024-07-16 PROCEDURE — 97140 MANUAL THERAPY 1/> REGIONS: CPT | Performed by: PHYSICAL THERAPIST

## 2024-07-16 PROCEDURE — 97110 THERAPEUTIC EXERCISES: CPT | Performed by: PHYSICAL THERAPIST

## 2024-07-16 PROCEDURE — 97530 THERAPEUTIC ACTIVITIES: CPT | Performed by: PHYSICAL THERAPIST

## 2024-07-16 NOTE — PLAN OF CARE
extensibility and allowing for proper ROM for normal function with self care, mobility, lifting and ambulation      GOALS     GOALS:  Patient stated goal: walk normally, stairs reciprocally  [x] Progressing: [] Met: [] Not Met: [] Adjusted    Therapist goals for Patient:   Short Term Goals: To be achieved in: 2 weeks  1. Independent in HEP and progression per patient tolerance, in order to prevent re-injury.   [] Progressing: [x] Met: [] Not Met: [] Adjusted  2. Patient will have a decrease in stiffness/discomfort to <2/10 to facilitate improvement in movement, function, and ADLs as indicated by Functional Deficits.  [] Progressing: [x] Met: [] Not Met: [] Adjusted    Long Term Goals: To be achieved in: 12 weeks  1. Disability index score of 20% or less for the LEFS to assist with reaching prior level of function with activities such as prolonged sitting for work.  [] Progressing: [x] Met: [] Not Met: [] Adjusted  2. Patient will demonstrate increased AROM of L knee extension/flexion to 0-130 without pain to allow for proper joint functioning to enable patient to ambulate without limp.   [x] Progressing: [] Met: [] Not Met: [] Adjusted  3. Patient will demonstrate increased Strength of L quad to demonstrate improved muscle activation/motor control at end range extension to allow for proper functional mobility to enable patient to return to hiking.   [] Progressing: [x] Met: [] Not Met: [] Adjusted  4. Patient will return to performing reciprocal stairs without increased symptoms or restriction to enable patient to move throughout home.   [x] Progressing: [] Met: [] Not Met: [] Adjusted  5. Patient will return to walking 2+ miles for exercise without increased symptoms.   [] Progressing: [x] Met: [] Not Met: [] Adjusted     Overall Progression Towards Functional goals/ Treatment Progress Update:  [x] Patient is progressing as expected towards functional goals listed.    [] Progression is slowed due to

## 2024-07-19 ENCOUNTER — OFFICE VISIT (OUTPATIENT)
Dept: ORTHOPEDIC SURGERY | Age: 44
End: 2024-07-19
Payer: COMMERCIAL

## 2024-07-19 VITALS — RESPIRATION RATE: 16 BRPM | BODY MASS INDEX: 32.57 KG/M2 | HEIGHT: 62 IN | WEIGHT: 177 LBS

## 2024-07-19 DIAGNOSIS — S83.512A RUPTURE OF ANTERIOR CRUCIATE LIGAMENT OF LEFT KNEE, INITIAL ENCOUNTER: Primary | ICD-10-CM

## 2024-07-19 PROCEDURE — 99213 OFFICE O/P EST LOW 20 MIN: CPT | Performed by: STUDENT IN AN ORGANIZED HEALTH CARE EDUCATION/TRAINING PROGRAM

## 2024-07-19 NOTE — PROGRESS NOTES
Knee Arthroscopy Follow-up  Jaycee Sanchez is here for follow up after left knee arthroscopic surgery. Surgery date was 9/28/23. Findings at surgery: Left knee ACL tear, lateral meniscus tear, grade II chondromalacia. Pain is controlled with current analgesics.  Medication(s) being used: percocet. The patient's pain is rated at 0/10. After her last visit she began PT with Becky at the [facility]. She has made significant progress with extension and walking mechanics. She has been able to walk 3 miles and bike.     Physical Examination:  Resp 16   Ht 1.575 m (5' 2\")   Wt 80.3 kg (177 lb)   BMI 32.37 kg/m²   Patient is awake, alert, and in no acute distress.  The incisions are healed  Her gait is antalgic. She does not ambulate with full knee extension.   Left knee ROM 1-2 - 120  Right knee -5 - 130.  Strength: 5/5 right knee extension, 5/5 left knee        Assessment:   10 months status post left knee arthroscopy, ACL reconstruction with hamstring tendon allograft, partial lateral meniscectomy.      Plan:   She has made significant progress with PT over the last 3 months.     She can continue to progress her exercise activity.     Finish PT.     Follow up as needed.             TANYA Puckett, PA-C  Board Certified by the National Committee on Certification of Physician Assistants  [facility]      This note was generated with use of a verbal recognition program (DRAGON) and was checked for errors.  It is possible that there are still dictated errors within this office note.  If so, please bring any errors to my attention for an addendum.  All efforts were made to ensure that this office note is accurate.

## 2024-09-08 ENCOUNTER — PATIENT MESSAGE (OUTPATIENT)
Dept: ORTHOPEDIC SURGERY | Age: 44
End: 2024-09-08

## (undated) DEVICE — REAMER SURG DIA9MM LO PROF FOR MED PORTAL TECH ACL RECON W/

## (undated) DEVICE — Device

## (undated) DEVICE — DRILL BIT 2.0MM (5/64'') X 128.0MM

## (undated) DEVICE — SYRINGE MED 50ML LUERLOCK TIP

## (undated) DEVICE — GOWN SIRUS NONREIN LG W/TWL: Brand: MEDLINE INDUSTRIES, INC.

## (undated) DEVICE — 3M™ STERI-DRAPE™ U-DRAPE 1015: Brand: STERI-DRAPE™

## (undated) DEVICE — MASC TURNOVER KIT: Brand: MEDLINE INDUSTRIES, INC.

## (undated) DEVICE — GLOVE ORTHO 7 1/2   MSG9475

## (undated) DEVICE — BANDAGE COMPR W4INXL5YD TAN BRTH SELF ADH WRP W/ HND TEAR

## (undated) DEVICE — 60 ML SYRINGE LUER-LOCK TIP: Brand: MONOJECT

## (undated) DEVICE — KNEE HOLDER DISPOSABLE LINER: Brand: ALVARADO®  KNEE SUPPORT

## (undated) DEVICE — GAUZE,SPONGE,4"X4",8PLY,STRL,LF,10/TRAY: Brand: MEDLINE

## (undated) DEVICE — DEVON TUBE HOLDER REMOVABLE TOUCH FASTEN STRAP: Brand: DEVON

## (undated) DEVICE — 4.5 MM FULL RADIUS ELITE STRAIGHT                                    DISPOSABLE BLADES, MAROON,PACKAGED 6                                    PER BOX, STERILE

## (undated) DEVICE — SUTURE FIBERWIRE SZ 2 W/ TAPERED NEEDLE BLUE L38IN NONABSORB BLU L26.5MM 1/2 CIRCLE AR7200

## (undated) DEVICE — SUTURE VCRL SZ 0 L36IN ABSRB UD CT-1 L36MM 1/2 CIR TAPR PNT VCP946H

## (undated) DEVICE — DRAPE,U/ SHT,SPLIT,PLAS,STERIL: Brand: MEDLINE

## (undated) DEVICE — 3M™ STERI-DRAPE™ ARTHROSCOPY SHEET WITH POUCH 1194: Brand: STERI-DRAPE™

## (undated) DEVICE — BASIC SINGLE BASIN 1-LF: Brand: MEDLINE INDUSTRIES, INC.

## (undated) DEVICE — TUBING, SUCTION, 1/4" X 10', STRAIGHT: Brand: MEDLINE

## (undated) DEVICE — LIGHT HANDLE: Brand: DEVON

## (undated) DEVICE — ANCHOR BONE OPEN ABS TIGHTROPE 2: Type: IMPLANTABLE DEVICE | Site: KNEE | Status: NON-FUNCTIONAL

## (undated) DEVICE — APPLICATOR MEDICATED 26 CC SOLUTION HI LT ORNG CHLORAPREP

## (undated) DEVICE — 3M™ COBAN™ NL STERILE NON-LATEX SELF-ADHERENT WRAP, 2084S, 4 IN X 5 YD (10 CM X 4,5 M), 18 ROLLS/CASE: Brand: 3M™ COBAN™

## (undated) DEVICE — SUTURE TIGERLOOP SZ 2-0 L20IN NONABSORBABLE WHT GRN BRAID AR7234T

## (undated) DEVICE — PENCIL ES L3M BTTN SWCH S STL HEX LOK BLDE ELECTRD HOLSTER

## (undated) DEVICE — HYPODERMIC SAFETY NEEDLE: Brand: MAGELLAN

## (undated) DEVICE — INTENDED FOR TISSUE SEPARATION, AND OTHER PROCEDURES THAT REQUIRE A SHARP SURGICAL BLADE TO PUNCTURE OR CUT.: Brand: BARD-PARKER ® STAINLESS STEEL BLADES

## (undated) DEVICE — 3M™ TEGADERM™ TRANSPARENT FILM DRESSING FRAME STYLE, 1626W, 4 IN X 4-3/4 IN (10 CM X 12 CM), 50/CT 4CT/CASE: Brand: 3M™ TEGADERM™

## (undated) DEVICE — SUTURE MCRYL + SZ 4-0 L27IN ABSRB UD L19MM PS-2 3/8 CIR MCP426H

## (undated) DEVICE — COVER,TABLE,77X90,STERILE: Brand: MEDLINE

## (undated) DEVICE — SUTURE PROL SZ 2-0 L18IN NONABSORBABLE BLU FS L26MM 3/8 CIR 8685H

## (undated) DEVICE — REAMER SURG DIA8MM LO PROF FOR MED PORTAL TECH ACL RECON W/

## (undated) DEVICE — SUTURE FIBERTAPE SZ 2-0 L36IN NONABSORBABLE BLU 2MM EA END AR7237

## (undated) DEVICE — DYONICS 5.5 MM BONECUTTER PLATINUM                                    BLADE, ORANGE, 10000 MAXIMUM RPM,                                    PACKAGED 6 PER BOX, STERILE

## (undated) DEVICE — SYRINGE IRRIG 60ML SFT PLIABLE BLB EZ TO GRP 1 HND USE W/

## (undated) DEVICE — ELECTRODE PT RET AD L9FT HI MOIST COND ADH HYDRGEL CORDED

## (undated) DEVICE — 3M™ STERI-STRIP™ REINFORCED ADHESIVE SKIN CLOSURES, R1547, 1/2 IN X 4 IN (12 MM X 100 MM), 6 STRIPS/ENVELOPE: Brand: 3M™ STERI-STRIP™

## (undated) DEVICE — GAUZE,SPONGE,2"X2",8PLY,STERILE,LF,2'S: Brand: MEDLINE

## (undated) DEVICE — PIN DRL DIA4MM ACL KNEE OPN EYELET TIGHTROPE AR1595T] ARTHREX INC]

## (undated) DEVICE — SUTURE PROL SZ 4-0 L18IN NONABSORBABLE BLU L19MM FS-2 3/8 8683G

## (undated) DEVICE — DYONICS 25 INFLOW TUBE SET, 3 PER BOX

## (undated) DEVICE — SUTURE VCRL + SZ 0 L18IN ABSRB UD L36MM CT-1 1/2 CIR VCP840D

## (undated) DEVICE — WEREWOLF FLOW 50 COBLATION WAND: Brand: COBLATION

## (undated) DEVICE — SOLUTION IRRIG 3000ML 0.9% SOD CHL USP UROMATIC PLAS CONT

## (undated) DEVICE — SUTURE VCRL + SZ 2-0 L36IN ABSRB UD L36MM CT-1 1/2 CIR VCP945H

## (undated) DEVICE — COVER,TABLE,44X76,STERILE: Brand: MEDLINE

## (undated) DEVICE — SPONGE LAP W18XL18IN WHT COT 4 PLY FLD STRUNG RADPQ DISP ST 2 PER PACK

## (undated) DEVICE — MICRO SAGITTAL BLADE (9.5 X 0.4 X 25.5MM)

## (undated) DEVICE — BANDAGE COBAN 4 IN COMPR W4INXL5YD FOAM COHESIVE QUIK STK SELF ADH SFT

## (undated) DEVICE — SUTURE PROL SZ 3-0 L18IN NONABSORBABLE BLU L19MM FS-2 3/8 8665G

## (undated) DEVICE — GLOVE ORANGE PI 7 1/2   MSG9075

## (undated) DEVICE — DYONICS 4.0 MM ELITE                                    ACROMIOBLASTER STRAIGHT DISPOSABLE                                    BURRS, SAGE GREEN, 10000 MAXIMUM                                    RPM, PACKAGED 6 PER BOX, STERILE

## (undated) DEVICE — DYONICS 25 PATIENT TUBE SET MUST                                    BE USED WITH 7211007, 12 PER BOX